# Patient Record
Sex: MALE | Race: WHITE | NOT HISPANIC OR LATINO | Employment: FULL TIME | URBAN - METROPOLITAN AREA
[De-identification: names, ages, dates, MRNs, and addresses within clinical notes are randomized per-mention and may not be internally consistent; named-entity substitution may affect disease eponyms.]

---

## 2023-10-17 PROBLEM — R35.1 NOCTURIA: Status: ACTIVE | Noted: 2023-10-17

## 2023-10-17 PROBLEM — R97.20 ELEVATED PSA: Status: ACTIVE | Noted: 2023-10-17

## 2023-10-17 PROBLEM — N52.9 ED (ERECTILE DYSFUNCTION): Status: ACTIVE | Noted: 2023-10-17

## 2023-10-17 PROBLEM — N13.8 BENIGN PROSTATIC HYPERPLASIA WITH URINARY OBSTRUCTION: Status: ACTIVE | Noted: 2023-10-17

## 2023-10-17 PROBLEM — N40.1 BENIGN PROSTATIC HYPERPLASIA WITH URINARY OBSTRUCTION: Status: ACTIVE | Noted: 2023-10-17

## 2023-10-17 RX ORDER — PANTOPRAZOLE SODIUM 40 MG/1
1 TABLET, DELAYED RELEASE ORAL DAILY
COMMUNITY

## 2023-10-17 RX ORDER — SIMVASTATIN 40 MG/1
40 TABLET, FILM COATED ORAL NIGHTLY
COMMUNITY

## 2023-10-17 RX ORDER — HYDROCHLOROTHIAZIDE 25 MG/1
25 TABLET ORAL DAILY
COMMUNITY

## 2023-10-17 RX ORDER — LISINOPRIL 40 MG/1
1 TABLET ORAL DAILY
COMMUNITY

## 2023-10-17 RX ORDER — METFORMIN HYDROCHLORIDE 1000 MG/1
1000 TABLET ORAL 2 TIMES DAILY
COMMUNITY

## 2023-10-17 RX ORDER — ASPIRIN 81 MG/1
81 TABLET ORAL
COMMUNITY

## 2023-10-18 ASSESSMENT — ENCOUNTER SYMPTOMS
COUGH: 0
PSYCHIATRIC NEGATIVE: 1
ENDOCRINE NEGATIVE: 1
CHILLS: 0
DIFFICULTY URINATING: 0
SHORTNESS OF BREATH: 0
NAUSEA: 0
EYES NEGATIVE: 1
ALLERGIC/IMMUNOLOGIC NEGATIVE: 1
FEVER: 0

## 2023-10-18 NOTE — PROGRESS NOTES
Subjective   Patient ID: Pavan Kramer is a 62 y.o. male.    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 He has failed oral medications. Most recent PSA was 6.70 on 8/23. Prior PSA was 2.87 on 4/2020. Prior PSA was 2.35 in 2019. Prior PSA was 2.14 in 2018. MRI on 9/23 showed PI-RAD 2.  No fhx of prostate cancer. Never had a bx. Chronic BPH sx are mild and stable. Some urgency and frequency. Denies dysuria. Denies hematuria. Nocturia x2-3.  No medication for LUT'S.       Review of Systems   Constitutional:  Negative for chills and fever.   HENT: Negative.     Eyes: Negative.    Respiratory:  Negative for cough and shortness of breath.    Cardiovascular:  Negative for chest pain and leg swelling.   Gastrointestinal:  Negative for nausea.   Endocrine: Negative.    Genitourinary:  Negative for difficulty urinating.        Negative except for documented in HPI   Allergic/Immunologic: Negative.    Neurological:         Alert & oriented X 3   Hematological:         Denies blood thinners   Psychiatric/Behavioral: Negative.         Objective   Physical Exam  Vitals and nursing note reviewed.   Constitutional:       General: He is not in acute distress.     Appearance: Normal appearance.   Pulmonary:      Effort: Pulmonary effort is normal.   Abdominal:      Tenderness: There is no abdominal tenderness.   Genitourinary:     Comments: Kidneys non palpable bilaterally  Bladder non palpable or tender  Scrotum no mass, No hydrocele  Epididymis- No spermatocele. Non Tender.  Testicles: No mass  Urethra: No discharge  Penis within normal limits... No lesions. Circumcised  Prostate - deferred  Neurological:      Mental Status: He is alert.         Assessment/Plan         Diagnoses and all orders for this visit:  Benign prostatic hyperplasia with urinary obstruction  Elevated PSA  Erectile dysfunction, unspecified erectile dysfunction type  Nocturia    All available PSA values reviewed, Options discussed. Questions answered.  MRI  reviewed  Will recheck PSA in 4 months   Diet changes for prostate health discussed and educational information given. Pros/Cons of prostate health supplements discussed.   Treatment options for LUTS reviewed  Discussed timed voiding. Discussed fluid and caffeine intake  Treatment options for ED reviewed.  TriMiX Rx given  Lifestyle change to help prevent UTIs discussed. Encouraged fluid intake.    F/U with PSA 4months    AFTER above visit I injected 0.2ml given of TriMix

## 2023-10-19 ENCOUNTER — OFFICE VISIT (OUTPATIENT)
Dept: UROLOGY | Facility: CLINIC | Age: 63
End: 2023-10-19
Payer: COMMERCIAL

## 2023-10-19 VITALS — RESPIRATION RATE: 16 BRPM | BODY MASS INDEX: 28.49 KG/M2 | WEIGHT: 188 LBS

## 2023-10-19 DIAGNOSIS — N13.8 BENIGN PROSTATIC HYPERPLASIA WITH URINARY OBSTRUCTION: ICD-10-CM

## 2023-10-19 DIAGNOSIS — R97.20 ELEVATED PSA: ICD-10-CM

## 2023-10-19 DIAGNOSIS — N40.1 BENIGN PROSTATIC HYPERPLASIA WITH URINARY OBSTRUCTION: ICD-10-CM

## 2023-10-19 DIAGNOSIS — R35.1 NOCTURIA: ICD-10-CM

## 2023-10-19 DIAGNOSIS — N52.9 ERECTILE DYSFUNCTION, UNSPECIFIED ERECTILE DYSFUNCTION TYPE: ICD-10-CM

## 2023-10-19 PROCEDURE — 99213 OFFICE O/P EST LOW 20 MIN: CPT | Performed by: UROLOGY

## 2023-10-19 PROCEDURE — 1036F TOBACCO NON-USER: CPT | Performed by: UROLOGY

## 2024-01-31 ENCOUNTER — LAB (OUTPATIENT)
Dept: LAB | Facility: LAB | Age: 64
End: 2024-01-31
Payer: COMMERCIAL

## 2024-01-31 DIAGNOSIS — R97.20 ELEVATED PSA: ICD-10-CM

## 2024-01-31 LAB — PSA SERPL-MCNC: 5.51 NG/ML

## 2024-01-31 PROCEDURE — 36415 COLL VENOUS BLD VENIPUNCTURE: CPT

## 2024-01-31 PROCEDURE — 84153 ASSAY OF PSA TOTAL: CPT

## 2024-02-20 ASSESSMENT — ENCOUNTER SYMPTOMS
ENDOCRINE NEGATIVE: 1
PSYCHIATRIC NEGATIVE: 1
ALLERGIC/IMMUNOLOGIC NEGATIVE: 1
EYES NEGATIVE: 1
FEVER: 0
CHILLS: 0
DIFFICULTY URINATING: 0
NAUSEA: 0
COUGH: 0
SHORTNESS OF BREATH: 0

## 2024-02-20 NOTE — PROGRESS NOTES
Subjective   Patient ID: Pavan Kramer is a 63 y.o. male.    HPI  Patient has hx of elevated PSA. Most recent PSA was 5.51 on 1/24. . Prior PSA was 6.70 on 8/23. Prior PSA was 2.87 on 4/2020. Prior PSA was 2.35 in 2019. Prior PSA was 2.14 in 2018. MRI on 9/23 showed PI-RAD 2.  No fhx of prostate cancer. Never had a bx. Chronic BPH sx are mild and stable. Some urgency and frequency. Denies dysuria. Denies hematuria. Nocturia x2-3.  No medication for LUT'S.  ED is chronic. Trimix PRN.       Review of Systems   Constitutional:  Negative for chills and fever.   HENT: Negative.     Eyes: Negative.    Respiratory:  Negative for cough and shortness of breath.    Cardiovascular:  Negative for chest pain and leg swelling.   Gastrointestinal:  Negative for nausea.   Endocrine: Negative.    Genitourinary:  Negative for difficulty urinating.        Negative except for documented in HPI   Allergic/Immunologic: Negative.    Neurological:         Alert & oriented X 3   Hematological:         Denies blood thinners   Psychiatric/Behavioral: Negative.         Objective   Physical Exam  Vitals and nursing note reviewed.   Constitutional:       General: He is not in acute distress.     Appearance: Normal appearance.   Pulmonary:      Effort: Pulmonary effort is normal.   Abdominal:      Tenderness: There is no abdominal tenderness.   Genitourinary:     Comments: Kidneys non palpable bilaterally  Bladder non palpable or tender  Scrotum no mass, No hydrocele  Epididymis- No spermatocele. Non Tender.  Testicles: No mass. WNL  Urethra: No discharge  Penis within normal limits... No lesions. circumcised  Prostate - symmetric, no nodules. BEnign  Seminal Vesicals: No mass.  Sphincter tone: normal  Neurological:      Mental Status: He is alert.         Assessment/Plan   Diagnoses and all orders for this visit:  Benign prostatic hyperplasia with urinary obstruction  Elevated PSA  Erectile dysfunction, unspecified erectile dysfunction  type  Nocturia    All available PSA values reviewed, Options discussed. Questions answered.  MRI reviewed  Pros and cons of prostate biopsy reviewed. Other options discussed. Questions answered  Theralogix given   Diet changes for prostate health discussed and educational information given. Pros/Cons of prostate health supplements discussed.   Treatment options for LUTS reviewed  Discussed timed voiding. Discussed fluid and caffeine intake  Treatment options for ED reviewed.  TriMix Rx refilled  Lifestyle change to help prevent UTIs discussed. Encouraged fluid intake.    F/U with PSA 6 months

## 2024-02-21 ENCOUNTER — OFFICE VISIT (OUTPATIENT)
Dept: UROLOGY | Facility: CLINIC | Age: 64
End: 2024-02-21
Payer: COMMERCIAL

## 2024-02-21 VITALS — RESPIRATION RATE: 16 BRPM | WEIGHT: 190 LBS | BODY MASS INDEX: 28.89 KG/M2

## 2024-02-21 DIAGNOSIS — N40.1 BENIGN PROSTATIC HYPERPLASIA WITH URINARY OBSTRUCTION: ICD-10-CM

## 2024-02-21 DIAGNOSIS — R35.1 NOCTURIA: ICD-10-CM

## 2024-02-21 DIAGNOSIS — N52.9 ERECTILE DYSFUNCTION, UNSPECIFIED ERECTILE DYSFUNCTION TYPE: ICD-10-CM

## 2024-02-21 DIAGNOSIS — R97.20 ELEVATED PSA: ICD-10-CM

## 2024-02-21 DIAGNOSIS — N13.8 BENIGN PROSTATIC HYPERPLASIA WITH URINARY OBSTRUCTION: ICD-10-CM

## 2024-02-21 PROCEDURE — 1036F TOBACCO NON-USER: CPT | Performed by: UROLOGY

## 2024-02-21 PROCEDURE — 99214 OFFICE O/P EST MOD 30 MIN: CPT | Performed by: UROLOGY

## 2024-02-21 RX ORDER — GABAPENTIN 300 MG/1
1 CAPSULE ORAL EVERY 8 HOURS
COMMUNITY
Start: 2018-09-27

## 2024-09-25 ENCOUNTER — LAB (OUTPATIENT)
Facility: LAB | Age: 64
End: 2024-09-25
Payer: COMMERCIAL

## 2024-09-25 DIAGNOSIS — R97.20 ELEVATED PSA: ICD-10-CM

## 2024-09-25 LAB — PSA SERPL-MCNC: 8.01 NG/ML

## 2024-09-25 PROCEDURE — 84153 ASSAY OF PSA TOTAL: CPT

## 2024-09-25 PROCEDURE — 36415 COLL VENOUS BLD VENIPUNCTURE: CPT

## 2024-10-03 ASSESSMENT — ENCOUNTER SYMPTOMS
COUGH: 0
ENDOCRINE NEGATIVE: 1
PSYCHIATRIC NEGATIVE: 1
CHILLS: 0
NAUSEA: 0
ALLERGIC/IMMUNOLOGIC NEGATIVE: 1
SHORTNESS OF BREATH: 0
FEVER: 0
EYES NEGATIVE: 1
DIFFICULTY URINATING: 0

## 2024-10-03 NOTE — PROGRESS NOTES
Subjective   Patient ID: Pavan Kramer is a 63 y.o. male.    HPI  Patient has hx of elevated PSA. Most recent PSA was 8.01 (9/24) Previous PSA was 5.51 on 1/24. . Prior PSA was 6.70 on 8/23. Prior PSA was 2.87 on 4/2020. Prior PSA was 2.35 in 2019. Prior PSA was 2.14 in 2018. MRI on 9/23 showed PI-RAD 2.  No fhx of prostate cancer. Never had a bx. Chronic BPH sx are mild and stable. Some urgency and frequency. Denies dysuria. Denies hematuria. Nocturia x2-3.  No medication for LUT'S.  ED is chronic. Trimix PRN.        Review of Systems   Constitutional:  Negative for chills and fever.   HENT: Negative.     Eyes: Negative.    Respiratory:  Negative for cough and shortness of breath.    Cardiovascular:  Negative for chest pain and leg swelling.   Gastrointestinal:  Negative for nausea.   Endocrine: Negative.    Genitourinary:  Negative for difficulty urinating.        Negative except for documented in HPI   Allergic/Immunologic: Negative.    Neurological:         Alert & oriented X 3   Hematological:         Denies blood thinners   Psychiatric/Behavioral: Negative.         Objective   Physical Exam  Vitals and nursing note reviewed.   Constitutional:       General: He is not in acute distress.     Appearance: Normal appearance.   Pulmonary:      Effort: Pulmonary effort is normal.   Abdominal:      Tenderness: There is no abdominal tenderness.   Genitourinary:     Comments: Kidneys non palpable bilaterally  Bladder non palpable or tender  Scrotum no mass, No hydrocele  Epididymis- No spermatocele. Non Tender.  Testicles: No mass. WNL  Urethra: No discharge  Penis within normal limits... No lesions. circumcised  Prostate - symmetric, no nodules. Perhaps firmer at [employer]PE[employer]  Seminal Vesicals: No mass.  Sphincter tone: normal  Neurological:      Mental Status: He is alert.         Assessment/Plan   Diagnoses and all orders for this visit:  Elevated PSA  Benign prostatic hyperplasia with urinary obstruction  Erectile  dysfunction, unspecified erectile dysfunction type  Nocturia      All available PSA values reviewed, Options discussed. Questions answered.  Past MRI reviewed  New MRI ordered  Pros and cons of prostate biopsy reviewed. Other options discussed. Questions answered   Diet changes for prostate health discussed and educational information given. Pros/Cons of prostate health supplements discussed.   Theralogix  Treatment options for LUTS reviewed  Discussed timed voiding. Discussed fluid and caffeine intake  Treatment options for ED reviewed.  Continue TriMix-refills SENT  Lifestyle change to help prevent UTIs discussed. Encouraged fluid intake.    F/U After MRI

## 2024-10-09 ENCOUNTER — APPOINTMENT (OUTPATIENT)
Dept: UROLOGY | Facility: CLINIC | Age: 64
End: 2024-10-09
Payer: COMMERCIAL

## 2024-10-09 VITALS — BODY MASS INDEX: 29.04 KG/M2 | SYSTOLIC BLOOD PRESSURE: 132 MMHG | DIASTOLIC BLOOD PRESSURE: 84 MMHG | WEIGHT: 191 LBS

## 2024-10-09 DIAGNOSIS — R97.20 ELEVATED PSA: ICD-10-CM

## 2024-10-09 DIAGNOSIS — N52.9 ERECTILE DYSFUNCTION, UNSPECIFIED ERECTILE DYSFUNCTION TYPE: ICD-10-CM

## 2024-10-09 DIAGNOSIS — N13.8 BENIGN PROSTATIC HYPERPLASIA WITH URINARY OBSTRUCTION: ICD-10-CM

## 2024-10-09 DIAGNOSIS — R35.1 NOCTURIA: ICD-10-CM

## 2024-10-09 DIAGNOSIS — N40.1 BENIGN PROSTATIC HYPERPLASIA WITH URINARY OBSTRUCTION: ICD-10-CM

## 2024-10-09 PROCEDURE — 1036F TOBACCO NON-USER: CPT | Performed by: UROLOGY

## 2024-10-09 PROCEDURE — 99214 OFFICE O/P EST MOD 30 MIN: CPT | Performed by: UROLOGY

## 2024-10-23 ENCOUNTER — HOSPITAL ENCOUNTER (OUTPATIENT)
Dept: RADIOLOGY | Facility: HOSPITAL | Age: 64
Discharge: HOME | End: 2024-10-23
Payer: COMMERCIAL

## 2024-10-23 DIAGNOSIS — R97.20 ELEVATED PSA: ICD-10-CM

## 2024-10-23 PROCEDURE — A9575 INJ GADOTERATE MEGLUMI 0.1ML: HCPCS | Performed by: UROLOGY

## 2024-10-23 PROCEDURE — 72197 MRI PELVIS W/O & W/DYE: CPT | Performed by: RADIOLOGY

## 2024-10-23 PROCEDURE — 72197 MRI PELVIS W/O & W/DYE: CPT

## 2024-10-23 PROCEDURE — 2550000001 HC RX 255 CONTRASTS: Performed by: UROLOGY

## 2024-10-23 RX ORDER — GADOTERATE MEGLUMINE 376.9 MG/ML
18 INJECTION INTRAVENOUS
Status: COMPLETED | OUTPATIENT
Start: 2024-10-23 | End: 2024-10-23

## 2024-10-31 ENCOUNTER — TELEMEDICINE (OUTPATIENT)
Dept: UROLOGY | Facility: CLINIC | Age: 64
End: 2024-10-31
Payer: COMMERCIAL

## 2024-10-31 DIAGNOSIS — R97.20 ELEVATED PSA: ICD-10-CM

## 2024-10-31 DIAGNOSIS — N13.8 BENIGN PROSTATIC HYPERPLASIA WITH URINARY OBSTRUCTION: ICD-10-CM

## 2024-10-31 DIAGNOSIS — R35.1 NOCTURIA: ICD-10-CM

## 2024-10-31 DIAGNOSIS — N52.9 ERECTILE DYSFUNCTION, UNSPECIFIED ERECTILE DYSFUNCTION TYPE: ICD-10-CM

## 2024-10-31 DIAGNOSIS — N40.1 BENIGN PROSTATIC HYPERPLASIA WITH URINARY OBSTRUCTION: ICD-10-CM

## 2024-10-31 PROCEDURE — 1036F TOBACCO NON-USER: CPT | Performed by: UROLOGY

## 2024-10-31 PROCEDURE — 99214 OFFICE O/P EST MOD 30 MIN: CPT | Performed by: UROLOGY

## 2024-10-31 RX ORDER — CIPROFLOXACIN 500 MG/1
500 TABLET ORAL 2 TIMES DAILY
Qty: 6 TABLET | Refills: 0 | Status: SHIPPED | OUTPATIENT
Start: 2024-10-31 | End: 2024-11-03

## 2024-10-31 ASSESSMENT — ENCOUNTER SYMPTOMS
ALLERGIC/IMMUNOLOGIC NEGATIVE: 1
FEVER: 0
ENDOCRINE NEGATIVE: 1
PSYCHIATRIC NEGATIVE: 1
CHILLS: 0
SHORTNESS OF BREATH: 0
NAUSEA: 0
COUGH: 0
EYES NEGATIVE: 1
DIFFICULTY URINATING: 0

## 2024-11-01 ENCOUNTER — PREP FOR PROCEDURE (OUTPATIENT)
Dept: UROLOGY | Facility: CLINIC | Age: 64
End: 2024-11-01
Payer: COMMERCIAL

## 2024-11-01 DIAGNOSIS — R97.20 ELEVATED PSA: ICD-10-CM

## 2024-11-18 ENCOUNTER — ANESTHESIA EVENT (OUTPATIENT)
Dept: OPERATING ROOM | Facility: HOSPITAL | Age: 64
End: 2024-11-18
Payer: COMMERCIAL

## 2024-11-19 ENCOUNTER — HOSPITAL ENCOUNTER (OUTPATIENT)
Facility: HOSPITAL | Age: 64
Setting detail: OUTPATIENT SURGERY
Discharge: HOME | End: 2024-11-19
Attending: UROLOGY | Admitting: UROLOGY
Payer: COMMERCIAL

## 2024-11-19 ENCOUNTER — ANESTHESIA (OUTPATIENT)
Dept: OPERATING ROOM | Facility: HOSPITAL | Age: 64
End: 2024-11-19
Payer: COMMERCIAL

## 2024-11-19 VITALS
SYSTOLIC BLOOD PRESSURE: 126 MMHG | TEMPERATURE: 97.5 F | BODY MASS INDEX: 28.47 KG/M2 | DIASTOLIC BLOOD PRESSURE: 86 MMHG | OXYGEN SATURATION: 98 % | RESPIRATION RATE: 18 BRPM | HEART RATE: 78 BPM | HEIGHT: 68 IN | WEIGHT: 187.83 LBS

## 2024-11-19 DIAGNOSIS — R97.20 ELEVATED PSA: Primary | ICD-10-CM

## 2024-11-19 PROBLEM — I10 HTN (HYPERTENSION): Status: ACTIVE | Noted: 2024-11-19

## 2024-11-19 PROBLEM — E11.9 DIABETES MELLITUS, TYPE 2 (MULTI): Status: ACTIVE | Noted: 2024-11-19

## 2024-11-19 LAB — GLUCOSE BLD MANUAL STRIP-MCNC: 154 MG/DL (ref 74–99)

## 2024-11-19 PROCEDURE — 82947 ASSAY GLUCOSE BLOOD QUANT: CPT

## 2024-11-19 PROCEDURE — 2500000004 HC RX 250 GENERAL PHARMACY W/ HCPCS (ALT 636 FOR OP/ED): Performed by: ANESTHESIOLOGY

## 2024-11-19 PROCEDURE — 3700000002 HC GENERAL ANESTHESIA TIME - EACH INCREMENTAL 1 MINUTE: Performed by: UROLOGY

## 2024-11-19 PROCEDURE — 88305 TISSUE EXAM BY PATHOLOGIST: CPT | Performed by: PATHOLOGY

## 2024-11-19 PROCEDURE — 2500000004 HC RX 250 GENERAL PHARMACY W/ HCPCS (ALT 636 FOR OP/ED): Performed by: PHARMACIST

## 2024-11-19 PROCEDURE — 7100000001 HC RECOVERY ROOM TIME - INITIAL BASE CHARGE: Performed by: UROLOGY

## 2024-11-19 PROCEDURE — 55700 PR PROSTATE NEEDLE BIOPSY ANY APPROACH: CPT | Performed by: UROLOGY

## 2024-11-19 PROCEDURE — 7100000010 HC PHASE TWO TIME - EACH INCREMENTAL 1 MINUTE: Performed by: UROLOGY

## 2024-11-19 PROCEDURE — 2500000001 HC RX 250 WO HCPCS SELF ADMINISTERED DRUGS (ALT 637 FOR MEDICARE OP): Performed by: ANESTHESIOLOGY

## 2024-11-19 PROCEDURE — 7100000002 HC RECOVERY ROOM TIME - EACH INCREMENTAL 1 MINUTE: Performed by: UROLOGY

## 2024-11-19 PROCEDURE — 3700000001 HC GENERAL ANESTHESIA TIME - INITIAL BASE CHARGE: Performed by: UROLOGY

## 2024-11-19 PROCEDURE — 76872 US TRANSRECTAL: CPT | Performed by: UROLOGY

## 2024-11-19 PROCEDURE — 7100000009 HC PHASE TWO TIME - INITIAL BASE CHARGE: Performed by: UROLOGY

## 2024-11-19 PROCEDURE — 3600000009 HC OR TIME - EACH INCREMENTAL 1 MINUTE - PROCEDURE LEVEL FOUR: Performed by: UROLOGY

## 2024-11-19 PROCEDURE — 76942 ECHO GUIDE FOR BIOPSY: CPT | Performed by: UROLOGY

## 2024-11-19 PROCEDURE — C1819 TISSUE LOCALIZATION-EXCISION: HCPCS | Performed by: UROLOGY

## 2024-11-19 PROCEDURE — 88344 IMHCHEM/IMCYTCHM EA MLT ANTB: CPT | Mod: TC,SAMLAB | Performed by: UROLOGY

## 2024-11-19 PROCEDURE — 3600000004 HC OR TIME - INITIAL BASE CHARGE - PROCEDURE LEVEL FOUR: Performed by: UROLOGY

## 2024-11-19 PROCEDURE — 2500000005 HC RX 250 GENERAL PHARMACY W/O HCPCS: Performed by: ANESTHESIOLOGY

## 2024-11-19 PROCEDURE — 2720000007 HC OR 272 NO HCPCS: Performed by: UROLOGY

## 2024-11-19 RX ORDER — LABETALOL HYDROCHLORIDE 5 MG/ML
5 INJECTION, SOLUTION INTRAVENOUS ONCE AS NEEDED
Status: DISCONTINUED | OUTPATIENT
Start: 2024-11-19 | End: 2024-11-19 | Stop reason: HOSPADM

## 2024-11-19 RX ORDER — MORPHINE SULFATE 4 MG/ML
4 INJECTION, SOLUTION INTRAMUSCULAR; INTRAVENOUS EVERY 5 MIN PRN
Status: DISCONTINUED | OUTPATIENT
Start: 2024-11-19 | End: 2024-11-19 | Stop reason: HOSPADM

## 2024-11-19 RX ORDER — OXYCODONE HYDROCHLORIDE 5 MG/1
5 TABLET ORAL EVERY 4 HOURS PRN
Status: DISCONTINUED | OUTPATIENT
Start: 2024-11-19 | End: 2024-11-19 | Stop reason: HOSPADM

## 2024-11-19 RX ORDER — HYDROCODONE BITARTRATE AND ACETAMINOPHEN 5; 325 MG/1; MG/1
1 TABLET ORAL EVERY 6 HOURS PRN
Qty: 20 TABLET | Refills: 0 | Status: SHIPPED | OUTPATIENT
Start: 2024-11-19

## 2024-11-19 RX ORDER — PROPOFOL 10 MG/ML
INJECTION, EMULSION INTRAVENOUS CONTINUOUS PRN
Status: DISCONTINUED | OUTPATIENT
Start: 2024-11-19 | End: 2024-11-19

## 2024-11-19 RX ORDER — CEFTRIAXONE 2 G/1
2 INJECTION, POWDER, FOR SOLUTION INTRAMUSCULAR; INTRAVENOUS ONCE
Status: DISCONTINUED | OUTPATIENT
Start: 2024-11-19 | End: 2024-11-19

## 2024-11-19 RX ORDER — SODIUM CHLORIDE, SODIUM LACTATE, POTASSIUM CHLORIDE, CALCIUM CHLORIDE 600; 310; 30; 20 MG/100ML; MG/100ML; MG/100ML; MG/100ML
100 INJECTION, SOLUTION INTRAVENOUS CONTINUOUS
Status: DISCONTINUED | OUTPATIENT
Start: 2024-11-19 | End: 2024-11-19 | Stop reason: HOSPADM

## 2024-11-19 RX ORDER — ACETAMINOPHEN 325 MG/1
975 TABLET ORAL ONCE
Status: COMPLETED | OUTPATIENT
Start: 2024-11-19 | End: 2024-11-19

## 2024-11-19 RX ORDER — CEFTRIAXONE 2 G/50ML
2 INJECTION, SOLUTION INTRAVENOUS ONCE
Status: COMPLETED | OUTPATIENT
Start: 2024-11-19 | End: 2024-11-19

## 2024-11-19 RX ORDER — ONDANSETRON HYDROCHLORIDE 2 MG/ML
4 INJECTION, SOLUTION INTRAVENOUS ONCE AS NEEDED
Status: COMPLETED | OUTPATIENT
Start: 2024-11-19 | End: 2024-11-19

## 2024-11-19 RX ORDER — MORPHINE SULFATE 2 MG/ML
2 INJECTION, SOLUTION INTRAMUSCULAR; INTRAVENOUS EVERY 5 MIN PRN
Status: DISCONTINUED | OUTPATIENT
Start: 2024-11-19 | End: 2024-11-19 | Stop reason: HOSPADM

## 2024-11-19 ASSESSMENT — PAIN SCALES - PAIN ASSESSMENT IN ADVANCED DEMENTIA (PAINAD): TOTALSCORE: MEDICATION (SEE MAR)

## 2024-11-19 ASSESSMENT — ENCOUNTER SYMPTOMS
COUGH: 0
ALLERGIC/IMMUNOLOGIC NEGATIVE: 1
FEVER: 0
NAUSEA: 0
DIFFICULTY URINATING: 0
CHILLS: 0
PSYCHIATRIC NEGATIVE: 1
ENDOCRINE NEGATIVE: 1
SHORTNESS OF BREATH: 0
EYES NEGATIVE: 1

## 2024-11-19 ASSESSMENT — PAIN SCALES - GENERAL
PAINLEVEL_OUTOF10: 3
PAINLEVEL_OUTOF10: 0 - NO PAIN
PAINLEVEL_OUTOF10: 5 - MODERATE PAIN
PAINLEVEL_OUTOF10: 0 - NO PAIN
PAINLEVEL_OUTOF10: 4
PAINLEVEL_OUTOF10: 4
PAINLEVEL_OUTOF10: 2
PAINLEVEL_OUTOF10: 4

## 2024-11-19 ASSESSMENT — PAIN - FUNCTIONAL ASSESSMENT
PAIN_FUNCTIONAL_ASSESSMENT: 0-10

## 2024-11-19 ASSESSMENT — COLUMBIA-SUICIDE SEVERITY RATING SCALE - C-SSRS
1. IN THE PAST MONTH, HAVE YOU WISHED YOU WERE DEAD OR WISHED YOU COULD GO TO SLEEP AND NOT WAKE UP?: NO
2. HAVE YOU ACTUALLY HAD ANY THOUGHTS OF KILLING YOURSELF?: NO
6. HAVE YOU EVER DONE ANYTHING, STARTED TO DO ANYTHING, OR PREPARED TO DO ANYTHING TO END YOUR LIFE?: NO

## 2024-11-19 ASSESSMENT — PAIN DESCRIPTION - LOCATION: LOCATION: PENIS

## 2024-11-19 NOTE — OP NOTE
Biopsy Prostate with Navigation, Ultrasonography Transrectal Prostate, Ultrasound guidance for prostate fusion biopsy Operative Note     Date: 2024  OR Location: [facility] OR    Name: Pavan Kramer, : 1960, Age: 63 y.o., MRN: [id number], Sex: male    Diagnosis  Pre-op Diagnosis      * Elevated PSA [R97.20] Post-op Diagnosis     * Elevated PSA [R97.20]     Procedures  Biopsy Prostate with Navigation  67428 - OR PROSTATE NEEDLE BIOPSY ANY APPROACH    Ultrasonography Transrectal Prostate  72015 - CHG US TRANSRECTAL    Ultrasound guidance for prostate fusion biopsy  89622 - CHG US GUIDANCE NEEDLE PLACEMENT IMG S&I      Surgeons      * Pavan Mcghee - Primary    Resident/Fellow/Other Assistant:  Surgeons and Role:  * No surgeons found with a matching role *    Staff:   Circulator: Ana Crawley Person: Estefani  Circulator: Kimberlyn    Anesthesia Staff: Anesthesiologist: Jean Paul Moreno MD    Procedure Summary  Anesthesia: Monitor Anesthesia Care  ASA: II  Estimated Blood Loss: 0mL  Intra-op Medications:   Administrations occurring from 0730 to 0745 on 24:   Medication Name Total Dose   ketamine injection 50 mg/ 5 mL (10 mg/mL) 30 mg   propofol (Diprivan) injection 10 mg/mL 76.68 mg              Anesthesia Record               Intraprocedure I/O Totals       None           Specimen:   ID Type Source Tests Collected by Time   1 : PROSTATE NEEDLE BIOPSY RIGHT Tissue PROSTATE NEEDLE BIOPSY RIGHT SURGICAL PATHOLOGY EXAM Pavan Mcghee MD 2024 0745   2 : PROSTATE NEEDLE BIOPSY LEFT Tissue PROSTATE NEEDLE BIOPSY LEFT SURGICAL PATHOLOGY EXAM Pavan Mcghee MD 2024 0745   3 : AREA OF INTEREST#1 Tissue PROSTATE BIOPSY TARGETED CHEMO SURGICAL PATHOLOGY EXAM Pavan Mcghee MD 2024 0745                 Drains and/or Catheters: * None in log *    Tourniquet Times:           Indications: Pavan Kramer is an 63 y.o. male who is having surgery for Elevated PSA [R97.20].     The patient was seen in the preoperative area.  The risks, benefits, complications, treatment options, non-operative alternatives, expected recovery and outcomes were discussed with the patient. The possibilities of reaction to medication, pulmonary aspiration, injury to surrounding structures, bleeding, recurrent infection, the need for additional procedures, failure to diagnose a condition, and creating a complication requiring transfusion or operation were discussed with the patient. The patient concurred with the proposed plan, giving informed consent.  The site of surgery was properly noted/marked if necessary per policy. The patient has been actively warmed in preoperative area.   Pre Op dx: Elevated PSA and Abnormal MRI of the prostate    Post Op Dx: SAME    Procedure: MRI Guided Fusion Bx of the prostate    Physician: KEY    Anesthesia: MAC    Estimated Blood Loss: Minimal    Complications: NONE    Indications and Consent: Patient present for prostate Biopsy of lesion found on MRI. After the risks,, benefits, and indications were explained he consented to the procedure.    PROCEDURE: After adequate sedation was obtained the ultrasound probe was inserted into the rectum. An ultrasound sweep of the prostate was performed. These images were then fused with the previously obtained MRI images. The lesion(s) were identified. Multiple targeted biopsies were obtained . I also performed standard Sextant biopsies of the right and left lobe of the prostate. The patient tolerated the procedure well.     Attending Attestation: I was present for the entire procedure.    Pavan Mcghee  Phone Number: [phone]

## 2024-11-19 NOTE — H&P
"History Of Present Illness  Pavan Kramer is a 63 y.o. male presenting with elevated PSA.     Past Medical History  Past Medical History:   Diagnosis Date    GERD (gastroesophageal reflux disease)     Hyperlipidemia     Hypertension     Type 2 diabetes mellitus        Surgical History  Past Surgical History:   Procedure Laterality Date    HERNIA REPAIR Right     recurrent hernia repair with mesh    SKIN GRAFT          Social History  He reports that he has never smoked. He has never used smokeless tobacco. He reports that he does not currently use alcohol. He reports current drug use. Frequency: 5.00 times per week. Drug: Marijuana.    Family History  Family History   Problem Relation Name Age of Onset    No Known Problems Mother      No Known Problems Father          Allergies  Patient has no known allergies.    Review of Systems   Constitutional:  Negative for chills and fever.   HENT: Negative.     Eyes: Negative.    Respiratory:  Negative for cough and shortness of breath.    Cardiovascular:  Negative for chest pain and leg swelling.   Gastrointestinal:  Negative for nausea.   Endocrine: Negative.    Genitourinary:  Negative for difficulty urinating.        Negative except for documented in HPI   Allergic/Immunologic: Negative.    Neurological:         Alert & oriented X 3   Hematological:         Denies blood thinners   Psychiatric/Behavioral: Negative.          Physical Exam  Vitals and nursing note reviewed.   Pulmonary:      Effort: Pulmonary effort is normal.   Abdominal:      Palpations: Abdomen is soft.      Tenderness: There is no abdominal tenderness.   Genitourinary:     Comments: Kidneys non palpable bilaterally  Bladder non palpable or tender  Neurological:      Mental Status: He is alert.          Last Recorded Vitals  Blood pressure 131/85, pulse 80, temperature 36.1 °C (97 °F), temperature source Temporal, resp. rate 12, height 1.727 m (5' 8\"), weight 85.2 kg (187 lb 13.3 oz), SpO2 " 96%.      Assessment/Plan   Assessment & Plan  Elevated PSA    HTN (hypertension)    Diabetes mellitus, type 2 (Multi)            Pavan Mcghee MD

## 2024-11-19 NOTE — ANESTHESIA POSTPROCEDURE EVALUATION
Patient: Pavan Kramer    Procedure Summary       Date: 11/19/24 Room / Location: [facility] OR 01 / [facility]    Anesthesia Start: 0736 Anesthesia Stop: 0803    Procedures:       Biopsy Prostate with Navigation      Ultrasonography Transrectal Prostate      Ultrasound guidance for prostate fusion biopsy Diagnosis:       Elevated PSA      (Elevated PSA [R97.20])    Surgeons: Pavan Mcghee MD Responsible Provider: Jean Paul Moreno MD    Anesthesia Type: MAC ASA Status: 2            Anesthesia Type: MAC    Vitals Value Taken Time   BP 93/73 11/19/24 0801   Temp 36.1 °C (97 °F) 11/19/24 0801   Pulse 77 11/19/24 0801   Resp 14 11/19/24 0801   SpO2 98 % 11/19/24 0801       Anesthesia Post Evaluation    Patient location during evaluation: bedside  Patient participation: complete - patient participated  Level of consciousness: sleepy but conscious  Pain management: adequate  Airway patency: patent  Cardiovascular status: acceptable  Respiratory status: acceptable  Hydration status: acceptable  Postoperative Nausea and Vomiting: none    No notable events documented.

## 2024-11-19 NOTE — ANESTHESIA PREPROCEDURE EVALUATION
Patient: Pavan Kramer    Procedure Information       Date/Time: 11/19/24 0730    Procedures:       Biopsy Prostate with Navigation      Ultrasonography Transrectal Prostate      Ultrasound guidance for prostate fusion biopsy    Location: [facility] OR 01 / [facility]    Surgeons: Pavan Mcghee MD            Relevant Problems   Cardiac   (+) HTN (hypertension)      /Renal   (+) Benign prostatic hyperplasia with urinary obstruction      Endocrine   (+) Diabetes mellitus, type 2 (Multi)       Clinical information reviewed:   Tobacco  Allergies  Meds   Med Hx  Surg Hx   Fam Hx  Soc Hx        NPO Detail:  NPO/Void Status  Carbohydrate Drink Given Prior to Surgery? : N  Date of Last Liquid: 11/19/24  Time of Last Liquid: 0500  Date of Last Solid: 11/17/24  Time of Last Solid: 1200  Last Intake Type: Clear fluids  Time of Last Void: 0615         Physical Exam    Airway  Mallampati: II  TM distance: >3 FB  Neck ROM: full     Cardiovascular   Rhythm: regular  Rate: normal     Dental    Pulmonary    Abdominal            Anesthesia Plan    History of general anesthesia?: yes  History of complications of general anesthesia?: no    ASA 2     MAC     Anesthetic plan and risks discussed with patient.

## 2024-12-04 LAB
LAB AP ASR DISCLAIMER: NORMAL
LABORATORY COMMENT REPORT: NORMAL
PATH REPORT.FINAL DX SPEC: NORMAL
PATH REPORT.GROSS SPEC: NORMAL
PATH REPORT.RELEVANT HX SPEC: NORMAL
PATH REPORT.TOTAL CANCER: NORMAL

## 2024-12-04 PROCEDURE — 88344 IMHCHEM/IMCYTCHM EA MLT ANTB: CPT | Performed by: PATHOLOGY

## 2024-12-10 ASSESSMENT — ENCOUNTER SYMPTOMS
FEVER: 0
ENDOCRINE NEGATIVE: 1
PSYCHIATRIC NEGATIVE: 1
COUGH: 0
SHORTNESS OF BREATH: 0
DIFFICULTY URINATING: 0
CHILLS: 0
NAUSEA: 0
ALLERGIC/IMMUNOLOGIC NEGATIVE: 1
EYES NEGATIVE: 1

## 2024-12-10 NOTE — PROGRESS NOTES
Subjective   Patient ID: Pavan Kramer is a 63 y.o. male.    HPI  Patient is here for prostate MRI fusion bx results. Path showed prostate cancer. Isreal 7. Most recent PSA was 8.01 (9/24) Previous PSA was 5.51 on 1/24. . Prior PSA was 6.70 on 8/23. Prior PSA was 2.87 on 4/2020. Prior PSA was 2.35 in 2019. Prior PSA was 2.14 in 2018. MRI on 9/23 showed PI-RAD 2.  No fhx of prostate cancer. Never had a bx. Chronic BPH sx are mild and stable. Some urgency and frequency. Denies dysuria. Some hematuria since the biopsy. Nocturia x2-3.  No medication for LUT'S.  ED is chronic. Trimix PRN.          Review of Systems   Constitutional:  Negative for chills and fever.   HENT: Negative.     Eyes: Negative.    Respiratory:  Negative for cough and shortness of breath.    Cardiovascular:  Negative for chest pain and leg swelling.   Gastrointestinal:  Negative for nausea.   Endocrine: Negative.    Genitourinary:  Negative for difficulty urinating.        Negative except for documented in HPI   Allergic/Immunologic: Negative.    Neurological:         Alert & oriented X 3   Hematological:         Denies blood thinners   Psychiatric/Behavioral: Negative.         Objective   Physical Exam    Assessment/Plan   Diagnoses and all orders for this visit:  Nocturia  Elevated PSA  Erectile dysfunction, unspecified erectile dysfunction type  Malignant neoplasm of prostate (Multi)      All available PSA values reviewed, Options discussed. Questions answered.  Path report reviewed. Tx Options discussed. Pros/cons of tx options reviewed. Questions answered  Referral to Dr Mederos to discuss XRT  Pros/cons of Active Surveillance discussed   Diet changes for prostate health discussed and educational information given. Pros/Cons of prostate health supplements discussed.   Treatment options for LUTS reviewed  Discussed timed voiding. Discussed fluid and caffeine intake  Treatment options for ED reviewed.  Continue TriMix-refills faxed      F/U 4  months.

## 2024-12-12 ENCOUNTER — APPOINTMENT (OUTPATIENT)
Dept: UROLOGY | Facility: CLINIC | Age: 64
End: 2024-12-12
Payer: COMMERCIAL

## 2024-12-12 VITALS
BODY MASS INDEX: 29.65 KG/M2 | DIASTOLIC BLOOD PRESSURE: 58 MMHG | RESPIRATION RATE: 16 BRPM | HEART RATE: 93 BPM | WEIGHT: 195 LBS | SYSTOLIC BLOOD PRESSURE: 85 MMHG

## 2024-12-12 DIAGNOSIS — R35.1 NOCTURIA: ICD-10-CM

## 2024-12-12 DIAGNOSIS — C61 MALIGNANT NEOPLASM OF PROSTATE (MULTI): ICD-10-CM

## 2024-12-12 DIAGNOSIS — R97.20 ELEVATED PSA: ICD-10-CM

## 2024-12-12 DIAGNOSIS — N52.9 ERECTILE DYSFUNCTION, UNSPECIFIED ERECTILE DYSFUNCTION TYPE: ICD-10-CM

## 2024-12-12 PROCEDURE — 3078F DIAST BP <80 MM HG: CPT | Performed by: UROLOGY

## 2024-12-12 PROCEDURE — 3074F SYST BP LT 130 MM HG: CPT | Performed by: UROLOGY

## 2024-12-12 PROCEDURE — 99214 OFFICE O/P EST MOD 30 MIN: CPT | Performed by: UROLOGY

## 2024-12-12 PROCEDURE — 4010F ACE/ARB THERAPY RXD/TAKEN: CPT | Performed by: UROLOGY

## 2024-12-12 PROCEDURE — 1036F TOBACCO NON-USER: CPT | Performed by: UROLOGY

## 2024-12-31 ENCOUNTER — PREP FOR PROCEDURE (OUTPATIENT)
Dept: UROLOGY | Facility: CLINIC | Age: 64
End: 2024-12-31
Payer: COMMERCIAL

## 2024-12-31 DIAGNOSIS — C61 PROSTATE CANCER (MULTI): ICD-10-CM

## 2025-01-14 ENCOUNTER — ANESTHESIA EVENT (OUTPATIENT)
Dept: OPERATING ROOM | Facility: HOSPITAL | Age: 65
End: 2025-01-14
Payer: COMMERCIAL

## 2025-01-14 ENCOUNTER — HOSPITAL ENCOUNTER (OUTPATIENT)
Facility: HOSPITAL | Age: 65
Setting detail: OUTPATIENT SURGERY
Discharge: HOME | End: 2025-01-14
Attending: UROLOGY | Admitting: UROLOGY
Payer: COMMERCIAL

## 2025-01-14 ENCOUNTER — ANESTHESIA (OUTPATIENT)
Dept: OPERATING ROOM | Facility: HOSPITAL | Age: 65
End: 2025-01-14
Payer: COMMERCIAL

## 2025-01-14 VITALS
RESPIRATION RATE: 16 BRPM | TEMPERATURE: 96.9 F | WEIGHT: 195 LBS | DIASTOLIC BLOOD PRESSURE: 93 MMHG | SYSTOLIC BLOOD PRESSURE: 126 MMHG | BODY MASS INDEX: 29.65 KG/M2 | OXYGEN SATURATION: 100 % | HEART RATE: 84 BPM

## 2025-01-14 DIAGNOSIS — C61 PROSTATE CANCER (MULTI): Primary | ICD-10-CM

## 2025-01-14 LAB — GLUCOSE BLD MANUAL STRIP-MCNC: 217 MG/DL (ref 74–99)

## 2025-01-14 PROCEDURE — 7100000001 HC RECOVERY ROOM TIME - INITIAL BASE CHARGE: Performed by: UROLOGY

## 2025-01-14 PROCEDURE — 2500000005 HC RX 250 GENERAL PHARMACY W/O HCPCS: Performed by: UROLOGY

## 2025-01-14 PROCEDURE — 2500000004 HC RX 250 GENERAL PHARMACY W/ HCPCS (ALT 636 FOR OP/ED): Performed by: UROLOGY

## 2025-01-14 PROCEDURE — 2500000004 HC RX 250 GENERAL PHARMACY W/ HCPCS (ALT 636 FOR OP/ED): Performed by: ANESTHESIOLOGY

## 2025-01-14 PROCEDURE — 7100000009 HC PHASE TWO TIME - INITIAL BASE CHARGE: Performed by: UROLOGY

## 2025-01-14 PROCEDURE — 3600000004 HC OR TIME - INITIAL BASE CHARGE - PROCEDURE LEVEL FOUR: Performed by: UROLOGY

## 2025-01-14 PROCEDURE — 3700000002 HC GENERAL ANESTHESIA TIME - EACH INCREMENTAL 1 MINUTE: Performed by: UROLOGY

## 2025-01-14 PROCEDURE — 3600000009 HC OR TIME - EACH INCREMENTAL 1 MINUTE - PROCEDURE LEVEL FOUR: Performed by: UROLOGY

## 2025-01-14 PROCEDURE — 55876 PLACE RT DEVICE/MARKER PROS: CPT | Performed by: UROLOGY

## 2025-01-14 PROCEDURE — 82947 ASSAY GLUCOSE BLOOD QUANT: CPT

## 2025-01-14 PROCEDURE — 2500000001 HC RX 250 WO HCPCS SELF ADMINISTERED DRUGS (ALT 637 FOR MEDICARE OP): Performed by: ANESTHESIOLOGY

## 2025-01-14 PROCEDURE — 2780000003 HC OR 278 NO HCPCS: Performed by: UROLOGY

## 2025-01-14 PROCEDURE — 7100000002 HC RECOVERY ROOM TIME - EACH INCREMENTAL 1 MINUTE: Performed by: UROLOGY

## 2025-01-14 PROCEDURE — 55874 TPRNL PLMT BIODEGRDABL MATRL: CPT | Performed by: UROLOGY

## 2025-01-14 PROCEDURE — 3700000001 HC GENERAL ANESTHESIA TIME - INITIAL BASE CHARGE: Performed by: UROLOGY

## 2025-01-14 PROCEDURE — 7100000010 HC PHASE TWO TIME - EACH INCREMENTAL 1 MINUTE: Performed by: UROLOGY

## 2025-01-14 PROCEDURE — C1889 IMPLANT/INSERT DEVICE, NOC: HCPCS | Performed by: UROLOGY

## 2025-01-14 DEVICE — STERILE PLACEMENT NEEDLES (17GA ETW X 20CM) WITH BONE WAX AND (1.2 X 3MM) SOFT TISSUE GOLD MARKER [3]
Type: IMPLANTABLE DEVICE | Site: PROSTATE | Status: FUNCTIONAL
Brand: FIDUCIAL MARKER KIT

## 2025-01-14 RX ORDER — GLIMEPIRIDE 2 MG/1
2 TABLET ORAL
COMMUNITY

## 2025-01-14 RX ORDER — METOPROLOL TARTRATE 50 MG/1
50 TABLET ORAL DAILY
COMMUNITY

## 2025-01-14 RX ORDER — SUCCINYLCHOLINE CHLORIDE 20 MG/ML
INJECTION INTRAMUSCULAR; INTRAVENOUS AS NEEDED
Status: DISCONTINUED | OUTPATIENT
Start: 2025-01-14 | End: 2025-01-14

## 2025-01-14 RX ORDER — ACETAMINOPHEN 325 MG/1
975 TABLET ORAL ONCE
Status: COMPLETED | OUTPATIENT
Start: 2025-01-14 | End: 2025-01-14

## 2025-01-14 RX ORDER — MORPHINE SULFATE 4 MG/ML
2 INJECTION, SOLUTION INTRAMUSCULAR; INTRAVENOUS EVERY 5 MIN PRN
Status: DISCONTINUED | OUTPATIENT
Start: 2025-01-14 | End: 2025-01-14 | Stop reason: HOSPADM

## 2025-01-14 RX ORDER — FENTANYL CITRATE 50 UG/ML
INJECTION, SOLUTION INTRAMUSCULAR; INTRAVENOUS AS NEEDED
Status: DISCONTINUED | OUTPATIENT
Start: 2025-01-14 | End: 2025-01-14

## 2025-01-14 RX ORDER — DEXAMETHASONE SODIUM PHOSPHATE 10 MG/ML
8 INJECTION INTRAMUSCULAR; INTRAVENOUS ONCE
Status: COMPLETED | OUTPATIENT
Start: 2025-01-14 | End: 2025-01-14

## 2025-01-14 RX ORDER — LABETALOL HYDROCHLORIDE 5 MG/ML
5 INJECTION, SOLUTION INTRAVENOUS ONCE AS NEEDED
Status: DISCONTINUED | OUTPATIENT
Start: 2025-01-14 | End: 2025-01-14 | Stop reason: HOSPADM

## 2025-01-14 RX ORDER — OXYCODONE HYDROCHLORIDE 5 MG/1
5 TABLET ORAL EVERY 4 HOURS PRN
Status: DISCONTINUED | OUTPATIENT
Start: 2025-01-14 | End: 2025-01-14 | Stop reason: HOSPADM

## 2025-01-14 RX ORDER — PROPOFOL 10 MG/ML
INJECTION, EMULSION INTRAVENOUS AS NEEDED
Status: DISCONTINUED | OUTPATIENT
Start: 2025-01-14 | End: 2025-01-14

## 2025-01-14 RX ORDER — SODIUM CHLORIDE 9 MG/ML
INJECTION INTRAMUSCULAR; INTRAVENOUS; SUBCUTANEOUS AS NEEDED
Status: DISCONTINUED | OUTPATIENT
Start: 2025-01-14 | End: 2025-01-14 | Stop reason: HOSPADM

## 2025-01-14 RX ORDER — ONDANSETRON HYDROCHLORIDE 2 MG/ML
4 INJECTION, SOLUTION INTRAVENOUS ONCE AS NEEDED
Status: DISCONTINUED | OUTPATIENT
Start: 2025-01-14 | End: 2025-01-14 | Stop reason: HOSPADM

## 2025-01-14 RX ORDER — ROCURONIUM BROMIDE 10 MG/ML
INJECTION, SOLUTION INTRAVENOUS AS NEEDED
Status: DISCONTINUED | OUTPATIENT
Start: 2025-01-14 | End: 2025-01-14

## 2025-01-14 RX ORDER — MORPHINE SULFATE 4 MG/ML
4 INJECTION, SOLUTION INTRAMUSCULAR; INTRAVENOUS EVERY 5 MIN PRN
Status: DISCONTINUED | OUTPATIENT
Start: 2025-01-14 | End: 2025-01-14 | Stop reason: HOSPADM

## 2025-01-14 RX ORDER — CIPROFLOXACIN 500 MG/1
250 TABLET ORAL 2 TIMES DAILY
Qty: 3 TABLET | Refills: 0 | Status: SHIPPED | OUTPATIENT
Start: 2025-01-14 | End: 2025-01-17

## 2025-01-14 RX ORDER — MIDAZOLAM HYDROCHLORIDE 1 MG/ML
2 INJECTION INTRAMUSCULAR; INTRAVENOUS ONCE
Status: COMPLETED | OUTPATIENT
Start: 2025-01-14 | End: 2025-01-14

## 2025-01-14 RX ORDER — ONDANSETRON HYDROCHLORIDE 2 MG/ML
4 INJECTION, SOLUTION INTRAVENOUS ONCE
Status: COMPLETED | OUTPATIENT
Start: 2025-01-14 | End: 2025-01-14

## 2025-01-14 RX ORDER — LEVOFLOXACIN 5 MG/ML
500 INJECTION, SOLUTION INTRAVENOUS ONCE
Status: COMPLETED | OUTPATIENT
Start: 2025-01-14 | End: 2025-01-14

## 2025-01-14 RX ORDER — PHENAZOPYRIDINE HYDROCHLORIDE 200 MG/1
200 TABLET, FILM COATED ORAL 3 TIMES DAILY PRN
Qty: 30 TABLET | Refills: 0 | Status: SHIPPED | OUTPATIENT
Start: 2025-01-14

## 2025-01-14 RX ORDER — SODIUM CHLORIDE, SODIUM LACTATE, POTASSIUM CHLORIDE, CALCIUM CHLORIDE 600; 310; 30; 20 MG/100ML; MG/100ML; MG/100ML; MG/100ML
100 INJECTION, SOLUTION INTRAVENOUS CONTINUOUS
Status: DISCONTINUED | OUTPATIENT
Start: 2025-01-14 | End: 2025-01-14 | Stop reason: HOSPADM

## 2025-01-14 RX ORDER — HYDROCODONE BITARTRATE AND ACETAMINOPHEN 5; 325 MG/1; MG/1
1 TABLET ORAL EVERY 6 HOURS PRN
Qty: 20 TABLET | Refills: 0 | Status: SHIPPED | OUTPATIENT
Start: 2025-01-14

## 2025-01-14 RX ADMIN — MORPHINE SULFATE 4 MG: 4 INJECTION, SOLUTION INTRAMUSCULAR; INTRAVENOUS at 13:50

## 2025-01-14 RX ADMIN — MORPHINE SULFATE 4 MG: 4 INJECTION, SOLUTION INTRAMUSCULAR; INTRAVENOUS at 13:59

## 2025-01-14 RX ADMIN — ONDANSETRON 4 MG: 2 INJECTION INTRAMUSCULAR; INTRAVENOUS at 12:09

## 2025-01-14 RX ADMIN — DEXAMETHASONE SODIUM PHOSPHATE 8 MG: 10 INJECTION, SOLUTION INTRAMUSCULAR; INTRAVENOUS at 12:09

## 2025-01-14 RX ADMIN — ACETAMINOPHEN 975 MG: 325 TABLET ORAL at 12:05

## 2025-01-14 RX ADMIN — FENTANYL CITRATE 100 MCG: 50 INJECTION, SOLUTION INTRAMUSCULAR; INTRAVENOUS at 13:11

## 2025-01-14 RX ADMIN — SUCCINYLCHOLINE CHLORIDE 60 MG: 20 INJECTION, SOLUTION INTRAMUSCULAR; INTRAVENOUS at 13:20

## 2025-01-14 RX ADMIN — FENTANYL CITRATE 100 MCG: 50 INJECTION, SOLUTION INTRAMUSCULAR; INTRAVENOUS at 13:20

## 2025-01-14 RX ADMIN — ROCURONIUM BROMIDE 10 MG: 10 INJECTION INTRAVENOUS at 13:11

## 2025-01-14 RX ADMIN — PROPOFOL 200 MG: 10 INJECTION, EMULSION INTRAVENOUS at 13:11

## 2025-01-14 RX ADMIN — LEVOFLOXACIN 500 MG: 5 INJECTION, SOLUTION INTRAVENOUS at 12:27

## 2025-01-14 RX ADMIN — SUCCINYLCHOLINE CHLORIDE 100 MG: 20 INJECTION, SOLUTION INTRAMUSCULAR; INTRAVENOUS at 13:11

## 2025-01-14 RX ADMIN — MIDAZOLAM HYDROCHLORIDE 2 MG: 1 INJECTION, SOLUTION INTRAMUSCULAR; INTRAVENOUS at 12:19

## 2025-01-14 ASSESSMENT — COLUMBIA-SUICIDE SEVERITY RATING SCALE - C-SSRS
2. HAVE YOU ACTUALLY HAD ANY THOUGHTS OF KILLING YOURSELF?: NO
6. HAVE YOU EVER DONE ANYTHING, STARTED TO DO ANYTHING, OR PREPARED TO DO ANYTHING TO END YOUR LIFE?: NO
1. IN THE PAST MONTH, HAVE YOU WISHED YOU WERE DEAD OR WISHED YOU COULD GO TO SLEEP AND NOT WAKE UP?: NO

## 2025-01-14 ASSESSMENT — ENCOUNTER SYMPTOMS
DIFFICULTY URINATING: 0
ALLERGIC/IMMUNOLOGIC NEGATIVE: 1
ENDOCRINE NEGATIVE: 1
NAUSEA: 0
COUGH: 0
EYES NEGATIVE: 1
CHILLS: 0
FEVER: 0
SHORTNESS OF BREATH: 0
PSYCHIATRIC NEGATIVE: 1

## 2025-01-14 ASSESSMENT — PAIN SCALES - GENERAL
PAINLEVEL_OUTOF10: 0 - NO PAIN
PAINLEVEL_OUTOF10: 7
PAINLEVEL_OUTOF10: 0 - NO PAIN
PAINLEVEL_OUTOF10: 7
PAINLEVEL_OUTOF10: 0 - NO PAIN
PAINLEVEL_OUTOF10: 0 - NO PAIN
PAINLEVEL_OUTOF10: 7
PAINLEVEL_OUTOF10: 7
PAINLEVEL_OUTOF10: 5 - MODERATE PAIN

## 2025-01-14 ASSESSMENT — PAIN - FUNCTIONAL ASSESSMENT
PAIN_FUNCTIONAL_ASSESSMENT: 0-10

## 2025-01-14 NOTE — ANESTHESIA PREPROCEDURE EVALUATION
Patient: Pavan Kramer    Procedure Information       Date/Time: 11/19/24 8930    Procedures:       Biopsy Prostate with Navigation      Ultrasonography Transrectal Prostate      Ultrasound guidance for prostate fusion biopsy    Location: [facility] OR 01 / [facility]    Surgeons: Pavan Mcghee MD            Relevant Problems   Anesthesia (within normal limits)      Cardiac   (+) HTN (hypertension)      /Renal   (+) Benign prostatic hyperplasia with urinary obstruction   (+) Malignant neoplasm of prostate (Multi)   (+) Prostate cancer (Multi)      Endocrine   (+) Diabetes mellitus, type 2 (Multi)       Clinical information reviewed:    Allergies  Meds               NPO Detail:  No data recorded       Physical Exam    Airway  Mallampati: II  TM distance: >3 FB  Neck ROM: full     Cardiovascular   Rhythm: regular  Rate: normal     Dental    Pulmonary    Abdominal            Anesthesia Plan    History of general anesthesia?: yes  History of complications of general anesthesia?: no    ASA 2     MAC     Anesthetic plan and risks discussed with patient.

## 2025-01-14 NOTE — ANESTHESIA POSTPROCEDURE EVALUATION
Patient: Pavan Kramer    Procedure Summary       Date: 01/14/25 Room / Location: [facility] OR 01 / [facility]    Anesthesia Start: 1311 Anesthesia Stop: 1338    Procedure: Insertion Fiducial Marker Prostate Diagnosis:       Prostate cancer (Multi)      (Prostate cancer (Multi) [C61])    Surgeons: Pavan Mcghee MD Responsible Provider: Jean Paul Moreno MD    Anesthesia Type: MAC ASA Status: 2            Anesthesia Type: MAC    Vitals Value Taken Time   /80 01/14/25 1400   Temp 36.8 °C (98.2 °F) 01/14/25 1340   Pulse 91 01/14/25 1400   Resp 14 01/14/25 1400   SpO2 97 % 01/14/25 1400       Anesthesia Post Evaluation    Patient location during evaluation: bedside  Patient participation: complete - patient participated  Level of consciousness: sleepy but conscious  Pain management: adequate  Airway patency: patent  Cardiovascular status: acceptable  Respiratory status: acceptable  Hydration status: acceptable  Postoperative Nausea and Vomiting: none    No notable events documented.

## 2025-01-14 NOTE — OP NOTE
Insertion Fiducial Marker Prostate Operative Note     Date: 2025  OR Location: [facility]    Name: Pavan Kramer, : 1960, Age: 64 y.o., MRN: [id number], Sex: male    Diagnosis  Pre-op Diagnosis      * Prostate cancer (Multi) [C61] Post-op Diagnosis     * Prostate cancer (Multi) [C61]     Procedures  Insertion Fiducial Marker Prostate  60126 - ME PLMT INTERSTITIAL DEV RADIAT TX PROSTATE 1/MULT      Surgeons      * Pavan Mcghee - Primary    Resident/Fellow/Other Assistant:  Surgeons and Role:  * No surgeons found with a matching role *    Staff:   Circulator: Ana Crawley Person: Estefani    Anesthesia Staff: Anesthesiologist: Jean Paul Moreno MD    Procedure Summary  Anesthesia: Anesthesia type not filed in the log.  ASA: II  Estimated Blood Loss: 0mL  Intra-op Medications: Administrations occurring from 1230 to 1300 on 25:  * No intraprocedure medications in log *           Anesthesia Record               Intraprocedure I/O Totals       None           Specimen: No specimens collected              Drains and/or Catheters: * None in log *    Tourniquet Times:         Implants:  Implants       Type Name Action Serial No.      Implant GOLD MARKERS, 1.2MM, SOFT TISSUE, 20CM 17GA NEEDLES, 3-PK, STRL - SN/A - [id number] Implanted N/A                Indications: Pavan Kramer is an 64 y.o. male who is having surgery for Prostate cancer (Multi) [C61].     The patient was seen in the preoperative area. The risks, benefits, complications, treatment options, non-operative alternatives, expected recovery and outcomes were discussed with the patient. The possibilities of reaction to medication, pulmonary aspiration, injury to surrounding structures, bleeding, recurrent infection, the need for additional procedures, failure to diagnose a condition, and creating a complication requiring transfusion or operation were discussed with the patient. The patient concurred with the proposed plan, giving informed consent.  The  site of surgery was properly noted/marked if necessary per policy. The patient has been actively warmed in preoperative area.   Anesthetic:  General    Estimated blood loss:  Minimal.    Complications:  None.    Pre-Op Diagnosis:  Prostate cancer.    Post-Op Diagnosis:  Prostate cancer.    Operation:  ULTRASOUND GUIDED TRANSPERNINEAL PLACEMENT OF SPACEOAR AND FIDUCIAL MARKERS    Physician: Litzy    Anesthesia: General    INDICATIONS AND CONSENT:    patient has a history of prostate cancer who now presents for treatment.  After the risks, benefits, alternatives and indications for the procedure were explained, he consented.     PROCEDURE:  The subject was positioned in the dorsal lithotomy position. A bilateral pudendal nerve block was performed using standard technique (1% lidocaine solution) The needle was advanced to the mid perineum region and an adequate dose of Lidocaine was injected. Once this area became anesthetized (~5 min), the needle was advanced until it was proximal to the pudendal nerve, and an additional dose of Lidocaine was injected. Once the area was completely anesthetized (~5 min), placement of SpaceOAR commence.  Intra-Service  Prior to needle insertion, an axial measurement of the space between the prostate (mid gland) and rectum was noted. SpaceOAR hydrogel was prepared as described in the ’s Instructions For Use while the patient was prepped. With the subject maintained in the dorsal lithotomy position, the transrectal ultrasound (TRUS) probe was positioned to enable visual guidance of the needle into the space between the prostate and the rectum.  Under transrectal ultrasound guidance, the 15 cm 18G needle was inserted through the rectourethralis muscle and the needle tip advanced into the perirectal fat inferior to the prostate all by using a transperineal approach and with side-fire transrectal ultrasound guidance. The needle position was confirmed in both sagittal and axial  fields. Saline was used to dissect the space between the Denonvilliers’ fascia and anterior rectal wall (“hydrodissection”). A space was created with hydrodissection.  With the needle tip at mid gland, the axial field was viewed to confirm the needle was not in the rectal wall (movement of the needle tip without corresponding movement of the rectal wall will confirm perirectal placement). While maintaining the desired position, aspiration was done to ensure that the needle was not in vascular space. The assembled SpaceOAR delivery system was then attached to the 18G needle.  Under ultrasound guidance (sagittal plane), a smooth, continuous injection technique was used to dispense the SpaceOAR hydrogel into the space between the prostate and rectum (Denonvilliers’ fascia and the anterior rectal wall). The entire syringe contents (10 mL total) were injected without stopping. Optimal visualization of the needle during hydrogel administration was maintained at all times.  An axial measurement of the space between the prostate (mid gland) and rectum immediately post-SpaceOAR injection was noted.  No suspected penetration or compromise of the rectal wall occurred.  Post-Service  He will continue ciprofloxacin 500 mg twice daily until gone and follow-up next week for his pre IMRT imaging for planning with Radiation Oncologist    The patient tolerated the procedure well.  There were no complications.     Attending Attestation: I was present and scrubbed for the entire procedure.    Pavan Mcghee  Phone Number: [phone]

## 2025-01-14 NOTE — ANESTHESIA PROCEDURE NOTES
Airway  Date/Time: 1/14/2025 1:18 PM  Urgency: elective      Staffing  Performed: attending   Authorized by: Jean Paul Moreno MD    Performed by: Jean Paul Moreno MD  Patient location during procedure: OR    Indications and Patient Condition  Preoxygenated: yes      Final Airway Details  Final airway type: endotracheal airway      Successful airway: ETT  Cuffed: yes   Successful intubation technique: video laryngoscopy  Blade size: #4  ETT size (mm): 8.0  Cormack-Lehane Classification: grade I - full view of glottis  Placement verified by: capnometry   Number of attempts at approach: 1

## 2025-01-14 NOTE — H&P
History Of Present Illness  Pavan Kramer is a 64 y.o. male presenting with prostate cancer.     Past Medical History  Past Medical History:   Diagnosis Date    GERD (gastroesophageal reflux disease)     Hyperlipidemia     Hypertension     Type 2 diabetes mellitus        Surgical History  Past Surgical History:   Procedure Laterality Date    HERNIA REPAIR Right     recurrent hernia repair with mesh    PROSTATE BIOPSY      SKIN GRAFT          Social History  He reports that he has never smoked. He has never used smokeless tobacco. He reports that he does not currently use alcohol. He reports current drug use. Frequency: 5.00 times per week. Drug: Marijuana.    Family History  Family History   Problem Relation Name Age of Onset    No Known Problems Mother      No Known Problems Father          Allergies  Patient has no known allergies.    Review of Systems   Constitutional:  Negative for chills and fever.   HENT: Negative.     Eyes: Negative.    Respiratory:  Negative for cough and shortness of breath.    Cardiovascular:  Negative for chest pain and leg swelling.   Gastrointestinal:  Negative for nausea.   Endocrine: Negative.    Genitourinary:  Negative for difficulty urinating.        Negative except for documented in HPI   Allergic/Immunologic: Negative.    Neurological:         Alert & oriented X 3   Hematological:         Denies blood thinners   Psychiatric/Behavioral: Negative.          Physical Exam  Vitals and nursing note reviewed.   Pulmonary:      Effort: Pulmonary effort is normal.   Abdominal:      Palpations: Abdomen is soft.      Tenderness: There is no abdominal tenderness.   Genitourinary:     Comments: Kidneys non palpable bilaterally  Bladder non palpable or tender  Neurological:      Mental Status: He is alert.          Last Recorded Vitals  Weight 88.5 kg (195 lb).      Assessment/Plan   Assessment & Plan  Prostate cancer (Multi)            Pavan Mcghee MD

## 2025-01-31 ENCOUNTER — HOSPITAL ENCOUNTER (OUTPATIENT)
Age: 65
Discharge: HOME | End: 2025-01-31
Payer: COMMERCIAL

## 2025-01-31 DIAGNOSIS — C61: Primary | ICD-10-CM

## 2025-01-31 PROCEDURE — A9575 INJ GADOTERATE MEGLUMI 0.1ML: HCPCS

## 2025-01-31 PROCEDURE — 72197 MRI PELVIS W/O & W/DYE: CPT

## 2025-01-31 NOTE — XMS RPT_ITS
Comprehensive CCD (C-CDA v2.1)  
  
                          Created on: 2025  
  
  
Xin Wesley  
External Reference #: CDR,PersonID:[id number]  
: 1960  
Sex: Male  
  
Demographics  
  
  
                                        Address             [address]  
[place]  [address]  
   
                                        Mobile Phone        [phone]  
   
                                        Home Phone          [phone]  
   
                                        Preferred Language  en  
   
                                        Marital Status        
   
                                        Spiritism Affiliation Unknown  
   
                                        Race                White  
   
                                        Ethnic Group        Not  or Lati  
no  
  
  
Author  
  
  
                                        Organization        [facility]  
  
  
Care Team Providers  
  
  
                                Care Team Member Name Role            Phone  
   
                                Aaron, Antonios Unavailable     [phone]  
   
                                MadsenByron oliveros     Unavailable     Unavailable  
   
                                Aaron MD, Cecilia Primary Care Provider [phone]  
[id number]  
   
                                Aaron, Antonios Unavailable     [phone]  
   
                                Unavailable     Unavailable     [phone]  
   
                                MD XIN SURESH Referring       Unavailabl  
e  
   
                                MD XIN SURESH Attending       Unavailabl  
e  
   
                                Aaron, Dr. Brooks Primary [facility]    Unavailable  
   
                                Aaron, Dr. Brooks Primary [facility]    Unavailable  
   
                                Xin Suresh      Referring       Unavailable  
   
                                Xin Suresh      Attending       Unavailable  
   
                                Aaron Cecilia PERSAUD Primary Care Provider [phone]  
[id number]  
   
                                Cecilia Walker MD Primary Care Provider [phone]  
[id number]  
   
                                SELF            Referring       Unavailable  
   
                                KARY LAMB Attending       Unavailabl  
e  
   
                                AARON, ANTONIOS Primary Care    Unavailable  
   
                                AARON, ANTONIOS Primary Care    Unavailable  
   
                                AARON, ANTONIOS Primary Care    Unavailable  
   
                                Aaron Cecilia PERSAUD Primary Care Provider [phone]  
[id number]  
   
                                Cecilia Walker MD Primary Care Provider [phone]  
[id number]  
   
                                XIN SURESH    Attending       Unavailable  
   
                                AARON, ANTONIOS Primary Care    Unavailable  
   
                                SURESHXIN    Attending       Unavailable  
   
                                AARON, ANTONIOS Primary Care    Unavailable  
   
                                SURESHXIN W    Attending       Unavailable  
   
                                AARON, ANTONIOS Primary Care    Unavailable  
   
                                SURESHXIN    Attending       Unavailable  
   
                                AARON, ANTONIOS Primary Care    Unavailable  
   
                                SURESHXIN W    Referring       Unavailable  
   
                                AARON, ANTONIOS Primary Care    Unavailable  
   
                                SURESHXIN W    Admitting       Unavailable  
   
                                SURESHXIN W    Attending       Unavailable  
   
                                AARON, ANTONIOS Primary Care    Unavailable  
   
                                SURESH, XIN W    Admitting       Unavailable  
   
                                SURESH, XIN W    Attending       Unavailable  
   
                                AARON, ANTONIOS Primary Care    Unavailable  
  
  
  
Allergies  
  
  
                                                    Allergy   
Classification                          Reported   
Allergen(s)               Allergy Type              Date of   
Onset                     Reaction(s)               Facility  
   
                                                      
(8 sources)                             Amoxicillin /   
Clavulanate;   
Translations:   
[AMOXICILLIN-POT   
CLAVULANATE]    Drug Allergy    10-      Unknown         [facility]  
Work Phone:   
[phone]  
  
  
  
Medications  
Current Medications  
  
  
  
                      Medication Drug Class(es) Dates      Sig (Normalized) Sig   
(Original)  
   
                                                    acetaminophen 325 mg   
/ HYDROcodone   
bitartrate 5 mg oral   
tablet  
(4 sources)               Opioid Agonist            Start:   
2024                              take 1 tablet by   
mouth every six   
hours for pain                          HYDROcodone-acet  
aminophen   
(Norco) 5-325 mg   
tablet   
Indications:   
Prostate cancer   
(Multi) Take 1   
tablet by mouth   
every 6 hours if   
needed for   
severe pain (7 -   
10). 20 tablet   
2025   
Active  
   
                                                    aspirin 81 mg   
delayed release oral   
tablet  
(10 sources)                            Platelet   
Aggregation   
Inhibitor,   
Nonsteroidal   
Anti-inflammatory   
Drug                                                take 1 tablet by   
mouth once                              aspirin 81 mg EC   
tablet Take 1   
tablet (81 mg)   
by mouth. TAKE   
PER DIRECTED   
Active  
   
                                        Comment on above:   Take by mouth.   
   
                                                    calcium chloride   
0.0014 meq/ml /   
potassium chloride   
0.004 meq/ml /   
sodium chloride   
0.103 meq/ml /   
sodium lactate 0.028   
meq/ml injectable   
solution  
(2 sources)                                         Start:   
2025  
End:   
01-                              take 100 mL   
intravenously every   
hour                                    100 mL/hr,   
intravenous,   
Continuous,   
Starting on 25 at 1415,   
For 1 day,   
Recovery (only)  
  
  
  
                                                    Start: 2024  
End: 2024                         take 100 mL intravenously every   
hour                                    100 mL/hr, intravenous,   
Continuous, Starting on 24 at 0830, For 1 day,   
Recovery (only)  
  
  
  
                                                    ciprofloxacin 500   
mg oral tablet  
(2 sources)                             Quinolone   
Antimicrobial                           Start:   
2025  
End: 2025                         take 0.5   
tablet by   
mouth twice   
daily                                   ciprofloxacin   
(Cipro) 500 mg   
tablet Indications:   
Prostate cancer   
(Multi) Take 0.5   
tablets (250 mg) by   
mouth 2 times a day   
for 3 days. 3   
tablet 2025 Active  
  
  
  
                                                    Start: 10-  
End: 2024                         take 1 tablet by mouth twice   
daily                                   ciprofloxacin (Cipro) 500 mg tablet   
Indications: Elevated PSA Take 1   
tablet (500 mg) by mouth 2 times a   
day for 3 days. 6 tablet 10/31/2024   
2024 Active  
  
  
  
                                                    gabapentin 300 mg   
oral capsule  
(8 sources)               Anti-epileptic Agent      Start:   
2018                              take 1 capsule   
by mouth every   
eight hours                             gabapentin   
(Neurontin) 300   
mg capsule 1   
capsule (300 mg)   
every 8 hours.   
2018   
Active  
   
                                        Comment on above:   q 8 HR.   
   
                                                    glimepiride 2 mg   
oral tablet  
(1 source)          Sulfonylurea                            take 1 tablet   
by mouth once   
daily before   
mealtime                                glimepiride   
(Amaryl) 2 mg   
tablet Take 1   
tablet (2 mg) by   
mouth once daily   
in the morning.   
Take before   
meals. Active  
   
                                                    labetalol   
hydrochloride 5   
mg/ml injectable   
solution  
(2 sources)                             beta-Adrenergic   
Blocker                                 Start:   
2025                                          5 mg,   
intravenous,   
Administer over   
1 Minutes, Once   
as needed,   
systolic blood   
pressure greater   
than 180 mmHg,   
dystolic blood   
pressure greater   
than 100 mmHg   
and heart rate   
greater than 60   
BPM, Starting on   
25 at   
1346, For 1   
dose, Recovery   
(only)  
  
  
  
                                Start: 2024                 5 mg, intraven  
ous, Administer over 1 Minutes, Once as needed,  
  
systolic blood pressure greater than 180 mmHg, dystolic blood   
pressure greater than 100 mmHg and heart rate greater than 60   
BPM, Starting on 24 at 0809, For 1 dose, Recovery   
(only)  
  
  
  
                                                    metoprolol tartrate   
50 mg oral tablet  
(1 source)          beta-Adrenergic Blocker                     take 1 tablet   
by mouth once   
daily                                   metoprolol tartrate   
(Lopressor) 50 mg   
tablet Take 1   
tablet by mouth   
once daily. Active  
   
                                                    1 ml morphine   
sulfate 4 mg/ml   
prefilled syringe  
(4 sources)               Opioid Agonist            Start:   
                                                   4 mg, intravenous,   
Every 5 min PRN,   
pain severe (7-10),   
first line,   
Starting on 25 at 1346,   
Recovery (only),   
Max total of 20 mg   
regardless of dose.  
  
  
  
                                Start: 2025                 2 mg, intraven  
ous, Every 5 min PRN, pain moderate (4-6),   
first   
line, Starting on 25 at 1346, Recovery (only), Max total   
of 20 mg regardless of dose.  
   
                                Start: 2024                 2 mg, intraven  
ous, Every 5 min PRN, pain moderate (4-6),   
first   
line, Starting on 24 at 0809, Recovery (only), Max   
total of 20 mg regardless of dose.  
   
                                Start: 2024                 4 mg, intraven  
ous, Every 5 min PRN, pain severe (7-10), first  
  
line, Starting on 24 at 0809, Recovery (only), Max   
total of 20 mg regardless of dose.  
  
  
  
                                                    Multivitamin preparation  
(8 sources)                                                     multivitamin (MU  
LTIPLE VITAMINS ORAL) Take by mouth.   
TAKE PER DIRECTED Active  
  
  
  
                                                                multivitamin (MU  
LTIPLE VITAMINS ORAL) Take   
by mouth. TAKE PER DIRECTED 0 Active  
   
                                                            take 1 tablet by pako  
th once daily in   
the evening                             Multiple Vitamins oral tablet ; 1 tab(s)  
   
orally once a day (in the evening) Quantity:   
0 Refills: 0 Ordered: 2022 Alec,   
Serene Generic Substitution Allowed  
  
  
  
                                                    2 ml ondansetron 2   
mg/ml injection  
(3 sources)                             Serotonin-3 Receptor   
Antagonist                              Start: 2025  
End: 2025                                     4 mg, intravenous, Once   
as needed,   
nausea/vomiting, first   
line, Starting on 25 at 1346, For 1   
dose, Recovery (only),   
When administering via IV   
Push, administer over 3-5   
minutes.  
  
  
  
                                                    Start: 2024  
End: 2024                                     4 mg, intravenous, Once as n  
eeded, nausea/vomiting, first line,  
  
Starting on 24 at 0809, For 1 dose, Recovery (only),   
When administering via IV Push, administer over 3-5 minutes.  
  
  
  
                                                    oxyCODONE   
hydrochloride 5 mg   
oral tablet  
(2 sources)         Opioid Agonist      Start: 2025   take 1 tablet   
by mouth   
every four   
hours as   
needed                                  5 mg, oral, Every 4   
hours PRN, pain mild   
(1-3), first line,   
Starting on 25 at 1346,   
Recovery (only),   
When able to take   
oral medications.,   
If ordered PRN for   
pain, nurse is   
permitted to   
administer this   
medication for   
higher pain scores   
based on patient   
preference? Yes  
  
  
  
                                        Start: 2024   take 1 tablet by pako  
th every   
four hours as needed                    5 mg, oral, Every 4 hours PRN, pain   
mild (1-3), first line, Starting on   
24 at 0809, Recovery   
(only), When able to take oral   
medications., If ordered PRN for   
pain, nurse is permitted to   
administer this medication for higher   
pain scores based on patient   
preference? Yes  
  
  
  
                                                    pantoprazole 40 mg   
delayed release oral   
tablet  
(11 sources)                            Proton Pump   
Inhibitor                                           take 1 tablet by   
mouth once daily                        pantoprazole (ProtoNix)   
40 mg EC tablet Take 1   
tablet (40 mg) by mouth   
once daily. Active  
  
  
  
                                                                pantoprazole DR   
(PROTONIX) 40 mg tablet Take by mouth every 24 hours. 0 Active  
   
                                                                Pantoprazole Sod  
ium 40 MG Oral Tablet Delayed Release Quantity: 0 Refills: 0   
Ordered: 30-Aug-2023 DO Active  
  
  
  
                                        Comment on above:   Source=S[facility]cripts,   
Medication=PANTOPRAZOLE SOD DR 40 MG TAB,   
OriginatingSource=[facility], OriginatingProvider=AARON,   
ANTONIOS, Duration=90, Date Last Modified/Filled=2022   
   
                                                            Take by mouth every   
24 hours.   
   
                                                    phenazopyridine   
hydrochloride 200 mg   
oral tablet  
(1 source)                                          Star  
t:   
                                      take 1   
tablet by   
mouth   
three   
times   
daily as   
needed for   
muscle   
spasms                                  phenazopyridine   
(Pyridium) 200 mg   
tablet Indications:   
Prostate cancer   
(Multi) Take 1   
tablet (200 mg) by   
mouth 3 times a day   
as needed for   
bladder spasms. 30   
tablet 2025   
Active  
   
                                                    promethazine   
(Phenergan) 6.25 mg   
in sodium chloride   
0.9% 50 mL IV  
(2 sources)                                         Star  
t:   
                                                  6.25 mg,   
intravenous,   
Administer over 15   
Minutes, Once as   
needed,   
Nausea/vomiting,   
second line,   
Starting on 25 at 1346,   
For 1 dose,   
Recovery (only)  
  
  
  
                                Start: 2024                 6.25 mg, intra  
venous, Administer over 15 Minutes, Once as   
needed,   
Nausea/vomiting, second line, Starting on 24 at 0809,   
For 1 dose, Recovery (only)  
  
  
  
Completed/Discontinued Medications  
  
  
  
                      Medication Drug Class(es) Dates      Sig (Normalized) Sig   
(Original)  
   
                                                    acetaminophen 325 mg   
oral tablet  
(2 sources)                                         Start:   
2025  
End:   
2025                              take 975 mg by   
mouth once as   
needed for pain                         975 mg, oral,   
Once, On 25 at 1145,   
For 1 dose,   
Preprocedure,   
Administer with   
small amount of   
water   
preoperatively.,   
If ordered PRN   
for pain, nurse   
is permitted to   
administer this   
medication for   
higher pain   
scores based on   
patient   
preference? Yes  
  
  
  
                                                    Start: 2024  
End: 2024                         take 975 mg by mouth once as   
needed for pain                         975 mg, oral, Once, On 24 at   
0630, For 1 dose, Preprocedure,   
Administer with small amount of water   
preoperatively., If ordered PRN for   
pain, nurse is permitted to administer   
this medication for higher pain scores   
based on patient preference? Yes  
  
  
  
                                                    acetaminophen 325   
mg / oxyCODONE   
hydrochloride 5 mg   
oral tablet  
(4 sources)                             Opioid   
Agonist                                 Start:   
2016                              take 1-2   
tablets by   
mouth every   
four hours   
as needed                               oxyCODONE-acetaminophen   
(PERCOCET) 5-325 mg tablet   
Take 1-2 tablets by mouth   
every 4 hours as needed for   
Pain. 40 tablet 0 2016   
Active  
  
  
  
                                        Start: 2016   take 1-2 tablets by   
mouth   
every six hours as needed   
for pain                                oxyCODONE-acetaminophen (PERCOCET) 5-325  
   
mg tablet Indications: Unilateral   
inguinal hernia without obstruction or   
gangrene, recurrence not specified Take   
1-2 tablets by mouth every 6 hours as   
needed for Pain. 30 tablet 0 2016   
Active  
  
  
  
                                        Comment on above:   Take 1-2 tablets by   
mouth every 6 hours as needed for Pain.   
   
                                                            Take 1-2 tablets by   
mouth every 4 hours as needed for Pain.   
   
                                                    cefTRIAXone 2000 mg   
injection  
(1 source)                              Cephalosporin   
Antibacterial                           Start:   
20  
End:   
20                                                  2 g, intravenous,   
at 100 mL/hr,   
Administer over 30   
Minutes, Once, On   
24 at   
0845, For 1 dose,   
premix bag,   
Suspected   
Indication (Select   
all that apply):   
Surgical   
Prophylaxis,   
Indications:   
Surgical   
Prophylaxis  
   
                                                    1 ml dexamethasone   
phosphate 10 mg/ml   
injection  
(1 source)                Corticosteroid            Start:   
20  
End:   
20  
25                                                  8 mg, intravenous,   
Once, On 25 at 1145,   
For 1 dose,   
Preprocedure  
   
                                                    diazePAM 10 mg oral   
tablet  
(2 sources)               Benzodiazepine            Start:   
20                                      take 1 tablet   
by mouth every   
hour                                    diazePAM 10 MG Oral   
Tablet TAKE 1   
TABLET 1 HOUR PRIOR   
TO PROCEDURE.   
Quantity: 1   
Refills: 0 Ordered:   
30-Aug-2023 Xin Suresh II, MD Start :   
30-Aug-2023 Active  
   
                                                    docusate sodium 100   
mg oral capsule  
(2 sources)                                         Start:   
20  
16                                      take 1 capsule   
by mouth twice   
daily                                   docusate sodium   
(COLACE) 100 mg   
capsule Take 1   
capsule by mouth   
twice daily. 20   
capsule 0   
2016 Active  
   
                                        Comment on above:   Take 1 capsule by mo  
uth twice daily.   
   
                                                    doxycycline hyclate   
100 mg oral tablet  
(1 source)                              Tetracycline-class   
Drug                                    Start:   
20  
End:   
20                                      take 1 tablet   
by mouth twice   
daily                                   doxycycline hyclate   
100 mg oral tablet   
; 1 tab(s) orally 2   
times a day   
Quantity: 20   
Refills: 0 Ordered:   
2021 Candice Talavera Start:   
2021 End:   
2021 Status:   
Completed Generic   
Substitution   
Allowed Comments:   
Avoid prolonged or   
excessive exposure   
to direct and/or   
artificial sunlight   
while taking this   
medication.Do not   
take this drug if   
you are   
pregnant.Finish all   
this medication   
unless otherwise   
directed by   
prescriber.Medicati  
on should be taken   
with plenty of   
water.  
   
                                        Comment on above:   Avoid prolonged or e  
xcessive exposure to direct and/or   
artificial sunlight while taking this medication.Do not take   
this drug if you are pregnant.Finish all this medication unless   
otherwise directed by prescriber.Medication should be taken with   
plenty of water.   
   
                                                    empagliflozin 25 mg   
oral tablet  
(12 sources)                            Sodium-Glucose   
Cotransporter 2   
Inhibitor                               Start:   
20                                                  Jardiance 25 MG   
Oral Tablet   
Quantity: 90   
Refills: 0 Ordered:   
22-Aug-2023 DO   
Start : 22-Aug-2023   
Active  
  
  
  
                                                take 1 tablet by mouth once lilia  
y empagliflozin (Jardiance) 10 mg Take 1 tablet  
(10   
mg) by mouth once daily. TAKE PER DIRECTED Active  
  
  
  
                                        Comment on above:   Source=Surescripts,   
Medication=JARDIANCE 10 MG TABLET,   
OriginatingSource=[facility], OriginatingProvider=AARON,   
ANTONIOS, Duration=90, Date Last Modified/Filled=2022   
   
                                                    erythromycin 0.005   
mg/mg ophthalmic   
ointment  
(1 source)                              Macrolide, Macrolide   
Antimicrobial                           S  
t  
a  
r  
t  
:   
0  
1  
-  
2  
0  
-  
2  
0  
2  
1  
E  
n  
d  
:   
0  
1  
-  
2  
6  
-  
2  
0  
2  
1                                                   erythromycin 0.5%   
ophthalmic ointment   
; 1 application in   
each affected eye 4   
times a day   
Quantity: 1 Refills:   
0 Ordered:   
2021 Candice Talavera Start:   
2021 End:   
2021 Status:   
Completed Generic   
Substitution Allowed   
Comments: For the   
eye.  
   
                                        Comment on above:   For the eye.   
   
                                                    gadoterate meglumine   
(Dotarem) 0.5 mmol/mL   
contrast injection 18   
mL  
(1 source)                                          S  
t  
a  
r  
t  
:   
1  
0  
-  
2  
3  
-  
2  
0  
2  
4  
E  
n  
d  
:   
1  
0  
-  
2  
3  
-  
2  
0  
2  
4                                       inject 18 mL   
intravenously   
once                                    18 mL, intravenous,   
Once in imaging,   
Starting on Wed   
10/23/24 at 1718,   
For 1 dose,   
Administer undiluted   
as rapid I.V. bolus   
injection  
   
                                                    hydroCHLOROthiazide   
25 mg oral tablet  
(13 sources)              Thiazide Diuretic         S  
t  
a  
r  
t  
:   
0  
8  
-  
2  
2  
-  
2  
0  
2  
3                                                   hydroCHLOROthiazide   
25 MG Oral Tablet   
Quantity: 90   
Refills: 0 Ordered:   
22-Aug-2023 DO Start   
: 22-Aug-2023 Active  
  
  
  
                                                take 1 tablet by mouth once lilia  
y hydroCHLOROthiazide (HYDRODiuril) 25 mg   
tablet   
Take 1 tablet (25 mg) by mouth once daily. TAKE   
PER DIRECTED Active  
   
                                                                hydroCHLOROthiaz  
benita Quantity: 0 Refills: 0   
Ordered: 2021 Bhavana Ayala Status:   
Completed Generic Substitution Allowed  
  
  
  
                                        Comment on above:   Source=[facility],   
Medication=HYDROCHLOROTHIAZIDE 25 MG TAB,   
OriginatingSource=[facility] [facility], OriginatingProvider=AARON,   
ANTONIOS, Duration=90, Date Last Modified/Filled=10-Apr-2022   
   
                                                            Take 25 mg by mouth   
once daily.   
   
                                                    ibuprofen 600 mg   
oral tablet  
(1 source)                Nonsteroidal Anti-inflammatory Drug Start  
:   
  
End:   
                                   take 1   
tablet by   
mouth   
every six   
hours                                   ibuprofen 600 mg   
oral tablet ; 1   
tab(s) orally   
every 6 hours   
Quantity: 40   
Refills: 0   
Ordered:   
2021 Candice Talavera Start:   
2021 End:   
2021   
Status: Completed   
Generic   
Substitution   
Allowed Comments:   
Do not take this   
drug if you are   
pregnant.It is   
very important   
that you take or   
use this exactly   
as directed. Do   
not skip doses or   
discontinue unless   
directed by your   
doctor.May cause   
drowsiness or   
dizziness.Obtain   
medical advice   
before taking any   
non-prescription   
drugs as some may   
affect the action   
of this   
medication.Take   
with food or milk.  
   
                                        Comment on above:   Do not take this francisco  
g if you are pregnant.It is very important  
that   
you take or use this exactly as directed. Do not skip doses or   
discontinue unless directed by your doctor.May cause drowsiness or   
dizziness.Obtain medical advice before taking any non-prescription   
drugs as some may affect the action of this medication.Take with   
food or milk.   
   
                                                    100 ml   
levoFLOXacin 5   
mg/ml injection  
(1 source)                Quinolone Antimicrobial   Start  
:   
  
End:   
                                               500 mg,   
intravenous, at   
100 mL/hr,   
Administer over 60   
Minutes, Once, On   
25 at   
1115, For 1 dose,   
Intraprocedure,   
premix bag, Dosing   
of this medication   
varies based on   
severity of   
illness. Does this   
patient have   
sepsis or concern   
for sepsis   
(probable or   
documented   
infection plus   
systemic   
manifestations of   
infection)? No,   
Suspected   
Indication (Select   
all that apply):   
Surgical   
Prophylaxis,   
Indications:   
Surgical   
Prophylaxis  
   
                                                    lisinopril 40 mg   
oral tablet  
(13 sources)                            Angiotensin Converting Enzyme   
Inhibitor                               Start  
:   
                                               Lisinopril 40 MG   
Oral Tablet   
Quantity: 90   
Refills: 0   
Ordered:   
22-Aug-2023 DO   
Start :   
22-Aug-2023 Active  
  
  
  
                                                take 1 tablet by mouth once lilia  
y lisinopril 40 mg tablet Take 1 tablet (40 mg)  
by   
mouth once daily. Active  
   
                                                                lisinopril Quant  
ity: 0 Refills: 0 Ordered:   
2021 Bhavana Ayala Status: Completed   
Generic Substitution Allowed  
  
  
  
                                        Comment on above:   Source=Surescripts,   
Medication=LISINOPRIL 40 MG TABLET,   
OriginatingSource=[facility], OriginatingProvider=AARON,   
ANTONIOS, Duration=90, Date Last Modified/Filled=2022   
   
                                                            Take 40 mg by mouth   
once daily.   
   
                                                    Medrol Dosepak 4 mg   
oral tablet  
(1 source)                                          Start:   
20                                                  Medrol Dosepak 4 mg   
oral tablet ; 1   
cap(s) orally   
Quantity: 1   
Refills: 0 Ordered:   
2022 Byron Madsen Start:   
2022 Generic   
Substitution   
Allowed Comments:   
It is very   
important that you   
take or use this   
exactly as   
directed. Do not   
skip doses or   
discontinue unless   
directed by your   
doctor.Obtain   
medical advice   
before taking any   
non-prescription   
drugs as some may   
affect the action   
of this   
medication.Take   
with food or milk.  
   
                                        Comment on above:   It is very important  
 that you take or use this exactly as   
directed. Do not skip doses or discontinue unless directed by   
your doctor.Obtain medical advice before taking any   
non-prescription drugs as some may affect the action of this   
medication.Take with food or milk.   
   
                                                    metFORMIN   
hydrochloride 1000 mg   
oral tablet  
(13 sources)              Biguanide                 Start:   
20  
23                                                  metFORMIN HCl -   
1000 MG Oral Tablet   
Quantity: 180   
Refills: 0 Ordered:   
22-Aug-2023 DO   
Start : 22-Aug-2023   
Active  
  
  
  
                                                take 1 tablet by mouth twice kike  
ly metFORMIN (Glucophage) 1,000 mg tablet Take   
1 tablet (1,000 mg) by mouth twice a day.   
TAKE PER DIRECTED Active  
   
                                                            take 1 tablet by pako  
 twice daily at   
mealtime                                metFORMIN (GLUCOPHAGE) 500 mg tablet Ahmet  
e   
500 mg by mouth twice daily with meals. 0   
Active  
   
                                                                metFORMIN Quanti  
ty: 0 Refills: 0 Ordered:   
2021 Bhavana Ayala Status:   
Completed Generic Substitution Allowed  
  
  
  
                                        Comment on above:   Source=Surescripts,   
Medication=METFORMIN HCL 1,000 MG TABLET,   
OriginatingSource=[facility], OriginatingProvider=CECILIA WALKER, Duration=90, Date Last Modified/Filled=10-Apr-2022   
   
                                                            Take 500 mg by mouth  
 twice daily with meals.   
   
                                                    5 ml midazolam 1   
mg/ml injection  
(1 source)                Benzodiazepine            Start:   
2025  
End:   
2025                                          2 mg,   
intravenous,   
Once, On 25 at   
1245, For 1   
dose  
  
  
  
                                                    Start: 2025  
End: 2025                                     2 mg, intravenous, Once, On   
25 at 1245, For 1 dose  
  
  
  
                                                    Multivitamin capsule  
(2 sources)                                                 take 1 capsule   
by mouth once   
daily                                   Multivitamin capsule   
Take 1 capsule by   
mouth once daily. 0   
Active  
   
                                        Comment on above:   Take 1 capsule by mo  
uth once daily.   
   
                                                    phenylephrine   
hydrochloride 25   
mg/ml ophthalmic   
solution  
(1 source)                              alpha-1 Adrenergic   
Agonist                                 Start:   
20  
24  
End:   
20  
24                                                  PHENYLephrine 2.5 %   
1 Drop (AK-DILATE,   
ROCKY-SYNEPHRINE)  
   
                                                    predniSONE 20 mg oral   
tablet  
(1 source)                                          Start:   
20  
21  
End:   
20  
21                                      take 1 tablet   
by mouth once   
daily at   
mealtime                                predniSONE 20 mg   
oral tablet ; 1   
tab(s) orally once a   
day Quantity: 6   
Refills: 0 Ordered:   
2021 IlanWilfredoCandice Start:   
2021 End:   
2021 Status:   
Completed Generic   
Substitution Allowed   
Comments: It is very   
important that you   
take or use this   
exactly as directed.   
Do not skip doses or   
discontinue unless   
directed by your   
doctor.Obtain   
medical advice   
before taking any   
non-prescription   
drugs as some may   
affect the action of   
this medication.Take   
with food or milk.  
   
                                        Comment on above:   It is very important  
 that you take or use this exactly as   
directed. Do not skip doses or discontinue unless directed by   
your doctor.Obtain medical advice before taking any   
non-prescription drugs as some may affect the action of this   
medication.Take with food or milk.   
   
                                                    psyllium 3400 mg   
powder for oral   
suspension  
(2 sources)                                                     Psyllium   
Seed-Sucrose powd   
Take 1 Tablespoonful   
by mouth once daily.   
0 Active  
   
                                        Comment on above:   Take 1 Tablespoonful  
 by mouth once daily.   
   
                                                    simvastatin 40 mg   
oral tablet  
(13 sources)                            HMG-CoA Reductase   
Inhibitor                               Start:   
20                                                  Simvastatin 40 MG   
Oral Tablet   
Quantity: 90   
Refills: 0 Ordered:   
26-Aug-2023 DO Start   
: 22-Aug-2023 Active  
  
  
  
                                                            take 1 tablet by pako  
th once daily at   
bedtime                                 simvastatin (Zocor) 40 mg tablet Take 1   
tablet (40 mg) by mouth once daily at   
bedtime. Active  
   
                                                                simvastatin Raad  
tity: 0 Refills: 0 Ordered:   
2021 Bhavana Ayala Status:   
Completed Generic Substitution Allowed  
  
  
  
                                        Comment on above:   Source=Surescripts,   
Medication=SIMVASTATIN 40 MG TABLET,   
OriginatingSource=[facility], OriginatingProvider=AARON,   
ANTONIOS, Duration=90, Date Last Modified/Filled=2022   
   
                                                            Take 40 mg by mouth   
daily at bedtime.   
   
                                                    tropicamide 10 mg/ml   
ophthalmic solution  
(2 sources)               Anticholinergic           Start:   
20  
End:   
20                                                  tropicamide 1 % 1   
Drop (MYDRIACYL)  
  
  
  
                                                    Start: 10-  
End: 10-                                     tropicamide 0.5 % 1 Drop (MY  
DRIACYL)  
  
  
  
Problems  
Active Problems  
  
  
                                                    Problem   
Classification  Problem         Date            Documented Date Episodic/Chronic  
   
                                                    Acute cerebrovascular   
disease  
(2 sources)                             Acute cerebrovascular   
disease                                 2022            
   
                                        Comment on above:   STROKE SYM   
   
                                                    Anxiety disorders  
(2 sources)                             Anxiety; Translations:   
[Anxiety state,   
unspecified]                            2022          Chronic  
   
                                                    Blindness and vision   
defects  
(6 sources)                             Bilateral hyperopia of   
eyes; Translations:   
[Hypermetropia,   
bilateral]                                                  Episodic  
   
                                                    Cancer of prostate  
(11 sources)                            Malignant tumor of   
prostate;   
Translations:   
[Malignant neoplasm of   
prostate]                               Onset:   
2024                Chronic  
   
                                                    Cataract  
(1 source)                              Bilateral senile   
combined form   
cataracts of eyes;   
Translations:   
[Combined forms of   
age-related cataract,   
bilateral]                              2024          Chronic  
   
                                                    Diabetes mellitus   
without complication  
(8 sources)                             Diabetes mellitus type   
2 without retinopathy;   
Translations: [Type 2   
diabetes mellitus   
without complications]                  Onset:   
2016                                          Chronic  
   
                                                    Disorders of lipid   
metabolism  
(2 sources)                             Hypercholesterolemia;   
Translations: [Pure   
hypercholesterolemia,   
unspecified]                            Onset:   
2016                Chronic  
   
                                                    Esophageal disorders  
(2 sources)                             Gastroesophageal   
reflux disease;   
Translations:   
[Gastro-esophageal   
reflux disease without   
esophagitis]                            Onset:   
2016                Chronic  
   
                                                    Essential   
hypertension  
(8 sources)                             Hypertensive disorder;   
Translations:   
[Unspecified essential   
hypertension]                           Onset:   
2016                Chronic  
   
                                                    Hyperplasia of   
prostate  
(15 sources)                            Benign prostatic   
hypertrophy with   
outflow obstruction;   
Translations: [Benign   
localized hyperplasia   
of prostate with   
urinary obstruction   
and other lower   
urinary tract symptoms   
(LUTS)]                                 Onset:   
09-                02-                Chronic  
   
                                                    Other connective   
tissue disease  
(1 source)                              Neurological symptom;   
Translations: [Other   
symptoms involving   
nervous and   
musculoskeletal   
systems]                                2022          Episodic  
   
                                                    Other eye disorders  
(2 sources)                             Asteroid hyalosis of   
left eye;   
Translations:   
[Crystalline deposits   
in vitreous body, left   
eye]                                                        Chronic  
   
                                                    Other eye disorders  
(1 source)                              Vitreous floaters of   
right eye;   
Translations: [Other   
vitreous opacities,   
right eye]                              2024          Chronic  
   
                                                    Other male genital   
disorders  
(13 sources)                            Male erectile   
dysfunction,   
unspecified;   
Translations:   
[Impotence of organic   
origin]                                 Onset:   
10-                02-                Chronic  
   
                                                    Other nervous system   
disorders  
(2 sources)                             Bell's palsy;   
Translations: [Bell's   
palsy]                                  2022          Episodic  
   
                                                    Other screening for   
suspected conditions   
(not mental disorders   
or infectious   
disease)  
(20 sources)                            Raised prostate   
specific antigen;   
Translations:   
[Elevated prostate   
specific antigen   
[PSA]]                                  Onset:   
09-                                          Episodic  
   
                                                    Unclassified  
(1 source)      Stroke-like symptoms                 2022        
  
  
Past or Other Problems  
  
  
                      Problem Classification Problem    Date       Documented Da  
te Episodic/Chronic  
   
                                                    Genitourinary symptoms   
and ill-defined   
conditions  
(16 sources)                            Nocturia;   
Translations:   
[Nocturia]                              Onset:   
09-                02-                Episodic  
   
                                                    Other diseases of   
kidney and ureters  
(2 sources)                             Other obstructive   
and reflux   
uropathy;   
Translations:   
[Other obstructive   
and reflux   
uropathy]                               Onset:   
10-                                          Episodic  
  
  
  
Results  
  
  
                          Test Name    Value        Interpretation Reference   
Range                                   Facility  
   
                                                    Glucose Test strip manual (B  
ld) [Mass/Vol]on 2025   
   
                      Glucose [Mass/Vol] 217 mg/dL  High       74 - 99 mg/dL [facility]  
   
                                                    Interpretation and   
review of   
laboratory results Abnormal                                        [facility]  
   
                      Glucose [Mass/Vol] 217 mg/dL  High       74-99      [facility]  
   
                                        Comment on above:   Performed By: #### 2  
[id number] ####  
SOHAN LOYA ([id number])  
[facility] ([facility])  
[address] [place] [address]   
   
                                                    Glucose Test strip manual (B  
ld) [Mass/Vol]on 2024   
   
                      Glucose [Mass/Vol] 154 mg/dL  High       74 - 99 mg/dL [facility]  
   
                                                    Interpretation and   
review of   
laboratory results Abnormal                                        [facility]  
   
                      Glucose [Mass/Vol] 154 mg/dL  High       74-99      [facility]  
   
                                        Comment on above:   Performed By: #### 2  
[id number] ####  
SOHAN LOYA ([id number])  
[facility] ([facility])  
[address] [place] [address]   
   
                                                    Surgical pathology studyon 1  
2024   
   
                                                    Surgical pathology   
study                                   Pathology report.total  
SEE COMMENT  
Surgical Pathology Case:   
[id number]  
Authorizing Provider:   
Xin Suresh MD   
Collected: 2024   
0745  
Ordering Location: [facility]   
Received: 2024   
1643  
Center OR  
Pathologist: León Robert MD  
Specimens: A) - PROSTATE   
NEEDLE BIOPSY RIGHT  
B) - PROSTATE NEEDLE   
BIOPSY LEFT  
C) - PROSTATE BIOPSY   
TARGETED ZULMA, AREA OF   
INTEREST#1  
Path report.final   
diagnosis  
SEE COMMENT  
A. Prostate, right,   
biopsy:  
-- PROSTATIC   
ADENOCARCINOMA, ACINAR   
TYPE, VLAD SCORE 3 +   
4 = 7, GRADE GROUP 2,   
INVOLVING THREE OF SIX   
FRAGMENTS AND   
APPROXIMATELY 5% OF THE   
TISSUE SUBMITTED (SEE   
NOTE).  
Note: Boydton pattern 4   
(poorly-formed glands)   
comprises approximately   
30-40% of tumor volume.  
B. Prostate, left,   
biopsy:  
-- PROSTATIC   
ADENOCARCINOMA, ACINAR   
TYPE, VLAD SCORE 3 +   
4 = 7, GRADE GROUP 2,   
INVOLVING ONE OF SIX   
FRAGMENTS AND   
APPROXIMATELY 5% OF THE   
TISSUE SUBMITTED (SEE   
NOTE).  
Note: Boydton pattern 4   
(poorly-formed glands)   
comprises approximately   
5% of tumor volume.  
C. Prostate, area of   
interest #1, biopsy:  
-- BENIGN PROSTATIC   
TISSUE.  
Note: PIN4 cocktail   
immunohistochemical   
stain (P63, 34BE12,   
AMACR) is performed, and   
supports the above   
diagnosis.  
The International   
Society of Urologic   
Pathologists has   
developed a prostate   
cancer grading system   
(the Grade Group System)   
which has been accepted   
by the World Health   
Organization. Shown   
below is a correlation   
between conventional   
Boydton grading/scoring   
and the new Grade Group   
system:  
? Grade Group 1 (Boydton   
score <=6)  
? Grade Group 2 (Boydton   
score 3+4=7)  
? Grade Group 3 (Vlad   
score 4+3=7)  
? Grade Group 4 (Vlad   
score 8)  
? Grade Group 5 (Vlad   
scores 9-10)  
Electronically signed by   
León Robert MD on   
2024 at 12:03 PM  
Laboratory comment  
By the signature on this   
report, the individual   
or group listed as   
making the Final   
Interpretation/Diagnosis   
certifies that they have   
reviewed this case.  
Path report.relevant Hx  
SEE COMMENT  
Pre-op diagnosis:  
Elevated PSA [R97.20]  
Path report.gross   
observation  
SEE COMMENT  
A: Received in formalin,   
labeled with the   
patient's name and   
hospital number, are two   
cylindrical fragments of   
tan soft tissue   
measuring 2.0 cm, 1.8   
cm, 1.0 cm, 1.0 cm, 1.0   
cm, and 0.9 cm in length   
by less than 0.1 cm in   
diameter. The specimen   
is submitted in toto in   
one cassette.  
JEK  
B: Received in formalin,   
labeled with the   
patient's name and   
hospital number, are two   
cylindrical fragments of   
tan soft tissue   
measuring 1.7 cm, 1.3   
cm, 1.3 cm, 1.2 cm, 1.1   
cm, and 1.0 cm in length   
by less than 0.1 cm in   
diameter. The specimen   
is submitted in toto in   
one cassette.  
MARIELAK  
C: Received in formalin,   
labeled with the   
patient's name and   
hospital number, are   
multiple cylindrical   
fragments of tan soft   
tissue measuring 2.1 cm,   
2.0 cm, 1.5 cm, 1.5 cm,   
and 1.4 cm in length by   
less than 0.1 cm in   
diameter. The specimen   
is submitted in toto in   
one cassette.  
JEK  
LAB AP ASR DISCLAIMER  
One or more of the   
reagents used to perform   
assays on this specimen   
MAY have contained   
components considered to   
be analyte specific   
reagents (ASR's). ASR's   
have not been cleared or   
approved by the U.S.   
Food and Drug   
Administration. These   
assays were developed   
and their performance   
characteristics   
determined by the   
Department of Pathology   
at [facility]. The FDA does not   
require this test to go   
through premarket FDA   
review. This test is   
used for clinical   
purposes. It should not   
be regarded as   
investigational or for   
research. This   
laboratory is certified   
under the Clinical   
Laboratory Improvement   
Amendments (CLIA) as   
qualified to perform   
high complexity clinical   
laboratory testing. The   
assays were performed   
with appropriate   
positive and negative   
controls which stained   
appropriately.      Normal                                  [facility]  
   
                                        Comment on above:   Order Comment: Pre-o  
p diagnosis:  
Elevated PSA [R97.20]   
   
                                                    MR PROSTATE WITH ZULMA BOUNDAR  
IES IF PIRADS 3 OR ABOVEon 10-   
   
                                                    MR PROSTATE WITH   
ZULMA BOUNDARIES IF   
PIRADS 3 OR ABOVE                       Interpreted By:   
Jesse Mclaughlin and Ravi Shweta  
STUDY:  
MR PROSTATE WITH ZULMA   
BOUNDARIES IF PIRADS 3   
OR ABOVE; 10/23/2024  
5:18 pm  
  
INDICATION:  
Signs/Symptoms:elevated   
PSA. PSA: 8.01   
(2024), 5.51   
(2024)  
  
,R97.20 Elevated   
prostate specific   
antigen (PSA)  
  
COMPARISON:  
None.  
  
ACCESSION NUMBER(S):  
[id number]  
  
ORDERING CLINICIAN:  
XIN SURESH  
  
TECHNIQUE:  
Multiplanar MRI of the   
pelvis was obtained   
including axial,   
sagittal  
and coronal T2 weighted   
SSFSE, axial and   
sagittal T2 FSE, axial   
DWI,  
pre and post gadolinium   
dynamic T1 GRE   
sequences.   
Multiparametric  
analysis was performed.  
  
18 ML of Dotarem was   
administered   
intravenously without   
immediate  
complications.  
  
FINDINGS:  
PROSTATE VOLUME:  
The prostate measures   
4.7 x 3.7 x 5.3 cm in   
right-to-left,  
anterior-posterior and   
craniocaudal dimension.  
  
Prostate weight is   
estimated at 48g. PSA   
density is 0.17 ng/mL/g.  
  
PROSTATE PARENCHYMA:  
There is heterogeneous   
enlargement of the   
transition zone,   
consistent  
with benign prostatic   
hyperplasia.  
  
The peripheral zone is   
diffusely heterogenous   
on T2WI, with bilateral  
polygonal and   
wedge-shaped   
T2-hypointense areas,   
that show no signs  
of abnormally restricted   
diffusion (PI-RADS 2).  
  
Within the apical left   
anterior peripheral zone   
there is a 1.4 cm T2  
hypointense lesion with   
diffusion restriction on   
high B value  
imaging. The lesion   
slightly crosses   
midline.  
  
EXTRACAPSULAR EXTENSION:  
None.  
  
SEMINAL VESICLES:  
Within normal limits.  
  
PELVIC LYMPH NODES:  
No abnormally enlarged   
pelvic lymph nodes are   
identified.  
  
PERITONEUM:  
No free or loculated   
fluid collections are   
evident in the pelvis.  
  
OTHER ORGANS:  
Mild diffuse bladder   
wall thickening and   
trabeculation is   
present,  
likely in the setting of   
chronic outlet   
obstruction. Fat   
containing  
inguinal hernias.  
  
BONES:  
No focal lesions are   
noted in the bone.  
  
Exam Quality:  
Is T2WI weighted imaging   
of diagnostic quality:   
Yes. T2WI  
assessment: Optimal. Is   
DWI of diagnostic   
quality: Yes. DWI  
assessment: Adequate. Is   
DCE of diagnostic   
quality: Yes. DCE  
assessment: Adequate.   
PI-QUAL score: Two or   
more sequences  
independently are of   
diagnostic quality   
Comments:  
  
IMPRESSION:  
1. PI-RADS 4 lesion   
measuring 1.4 cm within   
the left anterior  
peripheral zone of the   
apical gland. No   
evidence of   
extracapsular  
disease.  
2. BPH changes of the   
transition zone. Diffuse   
non nodular  
hypointensities within   
the peripheral zone,   
without evidence of  
focally restricted   
diffusion ( PI-RADS 2).  
  
PI-RADS 4 - High   
(clinically significant   
cancer is likely to be  
present).  
  
I personally reviewed   
the images/study and I   
agree with the findings  
as stated. This study   
was interpreted at   
[place], [place].  
  
MACRO:  
None  
  
Signed by: Jesse Mclaughlin 10/25/2024   
11:16 AM  
Dictation workstation:   
[id number]        Normal                                  [facility]  
   
                                                    Prostate specific Agon    
   
                                                    Prostate specific   
Ag [Mass/Vol]   8.01 ng/mL      High            <=4.00          [facility]  
   
                                        Comment on above:   Order Comment: The F  
DA requires that the method used for PSA   
assay   
be reported to the physician. Values obtained with different assay   
methods must not be used interchangeably. This test was performed   
at [facility] using the Access Hybritech PSA   
assay is a two-site immunoenzymatic sandwich  
assay. The assay is approved for measurement of prostate-specific   
antigen (PSA)in serum and may be used in conjunction with a   
digital rectal examination in men 50 years and older as an aid in   
detection of prostate cancer.  
5-Alpha-reductase inhibitors (e.g. Proscar, Finasteride, Avodart,   
Dutasteride and Loretta) for the treatment of BPH have been shown to   
lower PSA levels by an average of 50% after 6 months of treatment.   
   
                                                            Performed By: #### 2  
857-1 ####  
SOHAN LOYA ([id number])  
[facility] ([facility])  
[address] [place] [address]   
   
                                                    Prostate specific Agon    
   
                                                    Prostate specific   
Ag [Mass/Vol]   5.51 ng/mL      High            <=4.00          [facility]  
   
                                        Comment on above:   Order Comment: The   
DA requires that the method used for PSA   
assay   
be reported to the physician. Values obtained with different assay   
methods must not be used interchangeably. This test was performed   
at [facility] using the Access Hybritech PSA   
assay is a two-site immunoenzymatic sandwich  
assay. The assay is approved for measurement of prostate-specific   
antigen (PSA)in serum and may be used in conjunction with a   
digital rectal examination in men 50 years and older as an aid in   
detection of prostate cancer.  
5-Alpha-reductase inhibitors (e.g. Proscar, Finasteride, Avodart,   
Dutasteride and Loretta) for the treatment of BPH have been shown to   
lower PSA levels by an average of 50% after 6 months of treatment.   
   
                                                            Performed By: #### 2  
857-1 ####  
SOHAN LOYA ([id number])  
[facility] ([facility])  
[address] [place], [place] [id number]   
   
                                                    MRI Prostateon 09-   
   
                      MRI Prostate            Normal                PB-Rddcvvd-V  
ic  
hland    
DO  
Work Phone:   
[phone]  
   
                                                    IO UA (automated w/o microsc  
opy)on 2023   
   
                                                    Protein (U)   
[Mass/Vol]      Trace                                           GM-Ypupnwx-Vdi  
land  
Work Phone:   
[phone]  
   
                                                    IO UA (automated   
w/o microscopy) Negative                                        OO-Bbhreqq-Fdt  
land  
Work Phone:   
[phone]  
   
                                                    IO UA (automated   
w/o microscopy) Normal                                          LT-Ssngger-Aui  
land  
Work Phone:   
[phone]  
   
                                                    IO UA (automated   
w/o microscopy) 5.5 1                                           WH-Vikvmba-Pbi  
land  
Work Phone:   
[phone]  
   
                                                    IO UA (automated   
w/o microscopy) Trace                                           TM-Pfmsxck-Lzu  
land  
Work Phone:   
[phone]  
   
                                                    IO UA (automated   
w/o microscopy) 1.020 1                                         XQ-Cuwspgf-Byr  
land  
Work Phone:   
[phone]  
   
                                                    IO UA (automated   
w/o microscopy) (+)small - 15                                   BB-Czaxecn-Lff  
land  
Work Phone:   
[phone]  
   
                                                    IO UA (automated   
w/o microscopy) 1000 mg/dl                                      AH-Qtohwnt-Hss  
land  
Work Phone:   
[phone]  
   
                                                    IO UA (automated   
w/o microscopy) Clear                                           FV-Eujtuhm-Rye  
land  
Work Phone:   
[phone]  
   
                                                    IO UA (automated   
w/o microscopy) Yellow                                          VF-Tzxpalm-KduFjuul  
Work Phone:   
[phone]  
   
                                                    Office Visit (Urology)on    
   
                                        Follow-up visit     Diagnoses/Problems  
Assessed  
Benign prostatic   
hyperplasia with urinary   
obstruction and other   
lower urinary tract  
symptoms (600.21)   
(N40.1,N13.8)  
Elevated PSA (790.93)   
(R97.20)  
Nocturia (788.43)   
(R35.1)  
Never a smoker  
Patient   
Discussion/Summary  
All available PSA values   
reviewed, Options   
discussed. Questions   
answered.  
Pros and cons of   
prostate biopsy   
reviewed. Other options   
discussed. Questions   
answered  
MRI ordered  
Diet changes for   
prostate health   
discussed and   
educational information   
given. Pros/Cons of   
prostate health   
supplements discussed.  
Treatment options for   
LUTS reviewed  
Discussed timed voiding.   
Discussed fluid and   
caffeine intake  
Treatment options for ED   
reviewed.  
DIscussed TriMix   
discussed  
Valium Rx given for MRI  
F/U after MRI  
  
Chief Complaint  
ELEVATED PSA  
  
History of Present   
IllnessPatient is here   
for elevated PSA. Most   
recent PSA was 6.70 on   
. Prior PSA was 2.87   
on 2020. Prior PSA was   
2.35 in 2019. Prior PSA   
was 2.14 in 2018. No fhx   
of prostate cancer.   
Never had a bx. Chronic   
BPH sx are mild and   
stable. Some urgency and   
frequency. Denies   
dysuria. Denies   
hematuria. Nocturia   
x2-3.. Patient states he   
is also having trouble   
ejaculating. ED is mild  
  
Review of Systems  
Constitutional: No   
fever, No chills.  
Eye: Negative.  
Ear/Nose/Mouth/Throat:   
Negative.  
Respiratory: No   
shortness of breath, No   
cough.  
Cardiovascular: No chest   
pain, No peripheral   
edema.  
Gastrointestinal: No   
nausea,  
Genitourinary: Negative   
except as documented in   
history of present   
illness.  
Hematology/Lymphatics:   
Patient denies being on   
blood thinners..  
Endocrine: Negative.  
Immunologic: Not   
immunocompromised.  
Musculoskeletal:   
Negative  
Integumentary: Negative.  
Neurologic: Alert and   
oriented X4.  
Psychiatric: Negative.  
  
Surgical History  
Problems  
History of Hernia repair  
Family History  
Mother  
No pertinent family   
history  
Father  
No pertinent family   
history  
Social History  
Problems  
Never a smoker  
Allergies  
Medication  
No Known Drug Allergies  
Recorded By: Christina Martin; 2023   
2:00:24 PM  
Current Meds  
  
Medication   
NameInstruction  
hydroCHLOROthiazide 25   
MG Oral Tablet  
Pantoprazole Sodium 40   
MG Oral Tablet Delayed   
Release  
Vitals  
Vital Signs  
Recorded: [id number]   
01:59PM  
Rugfeiqbxpg32  
Height5 ft 8 in  
Xssyuo745 lb  
BMI Feubevooxg78.52   
kg/m2  
BSA Calculated1.96  
Tobacco Useb) No  
PHQ-2 Patient   
Declined/Screening not   
indicatedYes  
Falls Screening (Age   
18+)a) No falls within   
the last year  
Physical Exam  
A/O x 3 in No apparent   
distress  
Constitutional: General   
appearance normal  
Respiratory: Respiratory   
effort is normal  
Gastrointestinal:Abdomen   
is not tender  
Genitourinary:  
Kidneys: Not palpable   
Bilaterally  
Bladder: Not palpable or   
tender  
Scrotum: No mass. No   
Hydrocele  
Epididymis: No   
spermatocele. Not tender  
Testicles: no mass. WNL  
Urethra: No Discharge  
Penis: WNL...No lesions.   
circumcised  
Prostate: Symmetric. No   
Nodules. BENIGN  
Seminal Vesicles: No   
mass  
Sphincter Tone: normal  
  
Signatures  
Electronically signed by   
: Xin Suresh II, MD; Aug   
30 2023 2:10PM EST   
(Author)            Normal                                   [facility]  
   
                                                    Tobacco Screening.on   
023   
   
                                                    Fall risk   
assessment                              a) No falls within the   
last year                                                   IS[facility]Hgqlozq-Pxo  
land  
Work Phone:   
[phone]  
   
                                                    Tobacco use status   
CP            b) No                                           VZ[facility]Nemchnd-Rbu  
land  
Work Phone:   
[phone]  
   
                      Tobacco Screening. Yes                              MP-Uro  
logy-Jonny  
land  
Work Phone:   
[phone]  
  
  
  
Vital Signs  
  
  
                          Date Time    Vital Sign   Value        Performing   
Clinician                               Facility  
   
                                                    2025   
14:                              Diastolic blood   
pressure                  93 mm[Hg]                 Xin Suresh MD  
Work Phone:   
[phone]                          [facility]  
   
                                                    2025   
14:          Heart rate          84 /min             Xin Suresh MD  
Work Phone:   
[phone]                          [facility]  
   
                                                    2025   
14:          Respiratory rate    16 /min             Xin Suresh MD  
Work Phone:   
[phone]                          [facility]  
   
                                                    2025   
14:                              SaO2% (BldA) [Mass   
fraction]                 100 %                     Xin Suresh MD  
Work Phone:   
[phone]                          [facility]  
   
                                                    2025   
14:                              Systolic blood   
pressure                  126 mm[Hg]                Xin Suresh MD  
Work Phone:   
[phone]                          [facility]  
   
                                                    2025   
14:          Body temperature    96.91 [degF]        Xin Suresh MD  
Work Phone:   
[phone]                          [facility]  
   
                                                    2025   
12:                              Body mass index   
(BMI) [Ratio]             29.65 kg/m2               Xin Suresh MD  
Work Phone:   
[phone]                          [facility]  
   
                                                    2025   
12:          Body weight         88.45 kg            Xin Suresh MD  
Work Phone:   
[phone]                          [facility]  
   
                                                    2024   
10:                              Body mass index   
(BMI) [Ratio]             29.65 kg/m2               Xin Suresh MD  
Work Phone:   
[phone]                          [facility]  
   
                                                    2024   
10:          Body weight         88.45 kg            Xin Suresh MD  
Work Phone:   
[phone]                          [facility]  
   
                                                    2024   
10:                              Diastolic blood   
pressure                  58 mm[Hg]                 Xin Suresh MD  
Work Phone:   
[phone]                          [facility]  
   
                                                    2024   
10:          Heart rate          93 /min             Xin Suresh MD  
Work Phone:   
[phone]                          [facility]  
   
                                                    2024   
10:          Respiratory rate    16 /min             Xin Suresh MD  
Work Phone:   
[phone]                          [facility]  
   
                                                    2024   
10:                              Systolic blood   
pressure                  85 mm[Hg]                 Xin Suresh MD  
Work Phone:   
[phone]                          [facility]  
   
                                                    2024   
09:                              Diastolic blood   
pressure                  86 mm[Hg]                 Xin Suresh MD  
Work Phone:   
[phone]                          [facility]  
   
                                                    2024   
09:          Heart rate          78 /min             Xin Suresh MD  
Work Phone:   
[phone]                          [facility]  
   
                                                    2024   
09:          Respiratory rate    18 /min             Xin Suresh MD  
Work Phone:   
[phone]                          [facility]  
   
                                                    2024   
09:                              SaO2% (BldA) [Mass   
fraction]                 98 %                      Xin Suresh MD  
Work Phone:   
[phone]                          [facility]  
   
                                                    2024   
09:                              Systolic blood   
pressure                  126 mm[Hg]                Xin Suresh MD  
Work Phone:   
[phone]                          [facility]  
   
                                                    2024   
08:          Body temperature    97.5 [degF]         Xin Suresh MD  
Work Phone:   
[phone]                          [facility]  
   
                                                    2024   
06:          Body height         172.7 cm            Xin Suresh MD  
Work Phone:   
[phone]                          [facility]  
   
                                                    2024   
06:                              Body mass index   
(BMI) [Ratio]             28.56 kg/m2               Xin Suresh MD  
Work Phone:   
[phone]                          [facility]  
   
                                                    2024   
06:          Body weight         85.2 kg             Xin Suresh MD  
Work Phone:   
[phone] [place] [address]  
   
                                                    10-   
07:                              Body mass index   
(BMI) [Ratio]             29.04 kg/m2               Xin Suresh MD  
Work Phone:   
[phone] [place] [address]  
   
                                                    10-   
07:          Body weight         86.64 kg            Xin Suresh MD  
Work Phone:   
[phone]                          [facility]  
   
                                                    10-   
07:                              Diastolic blood   
pressure                  84 mm[Hg]                 Xin Suresh MD  
Work Phone:   
[phone]                          [facility]  
   
                                                    10-   
07:                              Systolic blood   
pressure                  132 mm[Hg]                Xin Suresh MD  
Work Phone:   
[phone]                          [facility]  
   
                                                    2024   
08:                              Body mass index   
(BMI) [Ratio]             28.89 kg/m2               Xin Suresh MD  
Work Phone:   
[phone]                          [facility]  
   
                                                    2024   
08:          Body weight         86.18 kg            Xin Suresh MD  
Work Phone:   
[phone]                          [facility]  
   
                                                    2024   
08:          Respiratory rate    16 /min             Xin Suresh MD  
Work Phone:   
[phone]                          [facility]  
   
                                                    2023   
13:          Body height         172.72 cm           Antonios Aaron  
Work Phone:   
[phone]                          [facility]  
Work Phone:   
[phone]   
13:                              Body mass index   
(BMI) [Ratio]             27.52 kg/m2               Antonios Aaron  
Work Phone:   
[phone]                          [facility]Ylchzrp[facility]Ihbjppk  
Work Phone:   
[phone]   
13:                              Body surface area   
Derived from formula      1.96 m2                   Antonios Aaron  
Work Phone:   
[phone]                          [facility]Hxjpesn[facility]Vkogxis  
Work Phone:   
[phone]   
13:          Body weight         82.1 kg             Antonios Aaron  
Work Phone:   
[phone]                          [facility]Dpnabfc[facility]Chcvgrc  
Work Phone:   
[phone]   
13:          Respiratory rate    16 /min             Antonios Aaron  
Work Phone:   
[phone]                          [facility]Yc[facility]uum[facility]Dkqti[facility]  
Work Phone:   
[phone]  
   
                                                    2022   
18:                              Diastolic blood   
pressure                  88 mm[Hg]                 Antonios Aaron  
Other Phone:   
[phone]                          [facility]  
   
                                                    2022   
18:          Heart rate          101 /min            Antonios Aaron  
Other Phone:   
[phone]                          [facility]  
   
                                                    2022   
18:          Respiratory rate    16 /min             Antonios Aaron  
Other Phone:   
[phone]                          [facility]  
   
                                                    2022   
18:                              SaO2% (BldA) [Mass   
fraction]                 97 %                      Antonios Aaron  
Other Phone:   
[phone]                          [facility]  
   
                                                    2022   
18:                              Systolic blood   
pressure                  113 mm[Hg]                Antonios Aaron  
Other Phone:   
[phone]                          [facility]  
   
                                                    2022   
16:          Body height         172.7 cm            Antonios Aaron  
Other Phone:   
[phone]                          [facility]  
   
                                                    2022   
16:          Body temperature    98.06 [degF]        Antonios Aaron  
Other Phone:   
[phone]                          [facility]  
   
                                                    2022   
16:          Body weight         69 kg               Antonios Aaron  
Other Phone:   
[phone]                          [facility]  
  
  
  
Encounters  
  
  
                          Encounter Date Encounter Type Care Provider Facility  
   
                                                    Start: 2025  
End: 2025                         Subsequent hospital   
visit by physician                      Xin Suresh MD  
Work Phone:   
[phone]                          [facility] OR  
   
                                        Comment on above:   Prostate cancer ([place]  
ti) (Primary Dx)   
   
                          Start: 2025 ambulatory   [facility]  
   
                                                    Start: 2024  
End: 2024                         Office outpatient visit   
25 minutes                              Xin Suresh MD  
Work Phone:   
[phone]                          [facility]  
   
                                        Comment on above:   Nocturia;  
Elevated PSA;  
Erectile dysfunction, unspecified erectile dysfunction type;  
Malignant neoplasm of prostate (Multi)   
   
                                                    Start: 2024  
End: 2024     ambulatory          [facility]  
   
Ambulatory  
   
                                                    Start: 2024  
End: 2024                         Subsequent hospital   
visit by physician                      Xin Suresh MD  
Work Phone:   
[phone]                          [facility] OR  
   
                                        Comment on above:   Elevated PSA (Primar  
y Dx)   
   
                          Start: 2024 ambulatory   [facility]  
   
                                                    Start: 10-  
End: 10-                         Office outpatient visit   
25 minutes                              Xin Suresh MD  
Work Phone:   
[phone]                          [facility]  
   
                                        Comment on above:   Erectile dysfunction  
, unspecified erectile dysfunction type;  
Elevated PSA;  
Benign prostatic hyperplasia with urinary obstruction;  
Nocturia   
   
                                                    Start: 10-  
End: 10-     ambulatory          [facility]  
   
Ambulatory  
   
                                                    Start: 10-  
End: 10-                         Subsequent hospital   
visit by physician        [facility]  
   
                                        Comment on above:   Elevated PSA   
   
                                                    Start: 10-  
End: 10-     ambulatory          [facility]  
   
                                                    Start: 10-  
End: 10-                         Office outpatient visit   
25 minutes                              Xin Suresh MD  
Work Phone:   
[phone]                          [facility]  
   
                                        Comment on above:   Elevated PSA;  
Benign prostatic hyperplasia with urinary obstruction;  
Erectile dysfunction, unspecified erectile dysfunction type;  
Nocturia   
   
                                                    Start: 10-  
End: 10-     ambulatory          [facility]  
   
Ambulatory  
   
                                                    Start: 2024  
End: 2024     ambulatory          [facility]  
   
                                                    Start: 2024  
End: 2024     ambulatory          SELF                Facility:[facility]  
   
                                                    Start: 2024  
End: 2024                         Patient encounter   
procedure                               Kary Lamb OD  
Work Phone:   
[phone]                          Optometry  
   
                                        Comment on above:   Type 2 diabetes elle  
itus without retinopathy (HCC) (Primary   
Dx);  
Combined form of senile cataract of both eyes;  
Floaters, right;  
Asteroid hyalosis of left eye;  
Hypermetropia, bilateral;  
Regular astigmatism of both eyes;  
Presbyopia   
   
                                                    Start: 2024  
End: 2024                         Office outpatient visit   
25 minutes                              Xin Suresh MD  
Work Phone:   
[phone]                          [facility]  
   
                                        Comment on above:   Benign prostatic hyp  
erplasia with urinary obstruction;  
Elevated PSA;  
Erectile dysfunction, unspecified erectile dysfunction type;  
Nocturia   
   
                                                    Start: 2024  
End: 2024     ambulatory          XIN SURESH         [facility]  
   
Ambulatory  
   
                                                    Start: 2024  
End: 2024     ambulatory          CECILIA WALKER      [facility]  
   
                          Start: 09- ambulatory   Dr. Cecilia Walker Faci  
lity:[id number]  
   
                                        Start: 2023   Office outpatient ne  
w   
45 minutes                              Cecilia Walker  
Work Phone:   
[phone]                          [facility]  
Work Phone:   
[phone]  
   
                          Start: 2023 ambulatory   MD XIN SURESH Fa  
cility:[id number]  
   
                                                    Start: 10-  
End: 10-                         Patient encounter   
procedure                               Shantanu Lamb OD  
Work Phone:   
[phone]                          Optometry  
   
                                        Comment on above:   Type 2 diabetes elle  
itus without retinopathy (HCC) (Primary   
Dx);  
Asteroid hyalosis of left eye;  
Hypermetropia, bilateral;  
Regular astigmatism of both eyes;  
Presbyopia   
   
                                                    Start: 2022  
End: 2022                         Emergency department   
patient visit             Byron Madsen                [facility]  
  
  
  
Procedures  
  
  
                          Date         Procedure    Procedure Detail Performing   
Clinician  
   
                                        Start: 2025   Glucose quantitative  
 blood   
xcpt reagent strip                                  Xin Suresh MD  
Work Phone:   
[phone]  
   
                                        Start: 2024   Glucose quantitative  
 blood   
xcpt reagent strip                                  Xin Suresh MD  
Work Phone:   
[phone]  
   
                          Start: 2024 PULSE OXIMETRY, SPOT              Alina Suresh MD  
Work Phone:   
[phone]  
   
                          Start: 2024 PROSTATE SPECIFIC ANTIGEN             
   CECILIA WALKER  
   
                                                    Start: 2022  
End: 2022     EKG impression                          Byron Madsen  
   
                          Start: 2022 Colonoscopy               Kary Jeanne caballero OD  
Work Phone:   
[phone]  
   
                          Start: 2017 Colonoscopy               Shantanu alvarez II, OD  
Work Phone:   
[phone]  
   
                                       Hernia repair              Cecilia Walker  
Work Phone:   
[phone]  
  
  
  
Plan of Treatment  
  
  
                          Date         Care Activity Detail       Author  
   
                                        Start: 2036   RSV High Risk: (Elde  
rly   
(60+) or Pregnant   
Population) (1 - 1-dose   
75+ series)                             RSV High Risk: (Elderly   
(60+) or Pregnant   
Population) (1 - 1-dose   
75+ series)                             [facility]  
   
                                        Start: 2029   Prostate specific   
antigen measurement                     Prostate Cancer Screening   
Discussion                              [facility]  
   
                                        Start: 2027   Diabetes mellitus   
screening                 Diabetes Screening        [facility]  
   
                                        Start: 2027   Screening for malign  
ant   
neoplasm of colon                                   [facility]  
   
                                        Start: 2025   Diabetes mellitus   
screening                 Diabetes Screening        [facility]  
   
                                                    Start: 2025  
End: 2025                         Patient encounter   
procedure                               2025 8:30 AM EDT   
Office Visit [facility] [address]   
[facility] [address]   
[place] [id number] [phone] Xin Suresh MD [address] [place]   
[place] [address]   
[phone] (Work)   
[phone] (Fax)                      [facility]  
   
                          Start: 2025 Glaucoma screening Dilated Retinal E  
xam [facility]  
   
                                                    Start: 2025  
End: 2025                         Plmt interstitial dev   
radiat tx prostate   
1/mult                                  Insertion Fiducial Marker   
Prostate Prostate cancer   
(Multi) 2025 1:09   
PM EST                                  [facility]  
   
                                                    Start: 2024  
End: 2024                         Prostate needle biopsy   
any approach                            Biopsy Prostate with   
Navigation Elevated PSA   
2024 7:34 AM EST                  [facility]  
   
                                                    Start: 2024  
End: 2024                         Us guidance needle   
placement img s&i                       Creation Mario Hole with   
Insertion Electrography   
Lead and Navigation   
Elevated PSA 2024   
7:34 AM EST                             [facility]  
   
                                                    Start: 2024  
End: 2024           Us transrectal            Ultrasonography   
Transrectal Prostate   
Elevated PSA 2024   
7:34 AM EST                             [facility]  
   
                                                    Start: 10-  
End: 10-           MR Prostate               MR prostate with zulma   
boundaries if pirads 3 or   
above Imaging Routine   
Elevated PSA Expected:   
10/09/2024 (Approximate),   
Expires: 10/09/2025                     [facility]  
Work Phone:   
[phone]  
   
                                        Comment on above:   Expected: 10/09/2024  
 (Approximate), Expires: 10/09/2025   
   
                                        Start: 2024   COVID-19 Vaccine ( season)                         COVID-19 Vaccine ( season)                         [facility]  
   
                          Start: 2024 Influenza vaccination Influenza Vacc  
ine (#1) [facility]  
   
                                                    Start: 2024  
End: 2025                         Prostate specific Ag   
[Mass/volume] in Serum   
or Plasma                               Prostate Specific Antigen   
Lab Routine Elevated PSA   
Expected: 2024   
(Approximate), Expires:   
2025                              [facility]  
Work Phone:   
[phone]  
   
                                        Comment on above:   Expected: 2024  
 (Approximate), Expires: 2025   
   
                          Start: 2024 Depression Assessment [facility]  
[facility]  
   
                                        Start: 10-   Hepatitis C antibody  
,   
confirmatory test         DILATED RETINAL EXAM      [facility]  
   
                                        Start: 2023   FUV, Provider: Xin Suresh II, Status: Pen,   
Time: 1:45 PM                           FUV, Provider: Xin Suresh II, Status: Pen,   
Time: 1:45 PM                           [facility]  
Work Phone:   
[phone]  
   
                          Start: 2023 Influenza vaccination Influenza Vacc  
ine (#1) [facility]  
   
                                        Start: 2023   Screening for malign  
ant   
neoplasm of colon                                   [facility]  
   
                          Start: 2022 Influenza vaccination INFLUENZA (#1)  
 [facility]  
   
                          Start: 2022 DEPRESSION ASSESSMENT [facility]  
[facility]  
   
                                        Start: 2021   RSV Pregnant patient  
s   
and/or patients aged   
60+ years (1 - 1-dose   
60+ series)                             RSV Pregnant patients   
and/or patients aged 60+   
years (1 - 1-dose 60+   
series)                                 [facility]  
   
                                        Start: 2021   RSV Vaccine (1 - 1-d  
ose   
60+ series)                             RSV Vaccine (1 - 1-dose   
60+ series)                             [facility]  
   
                          Start: 2018 Colonoscopy  COLONOSCOPY  [facility]  
   
                                        Start: 2018   COLORECTAL CANCER   
SCREENING                               COLORECTAL CANCER   
SCREENING                               [facility]  
   
                                        Start: 2016   PROSTATE CANCER   
SCREENING DISCUSSION                    PROSTATE CANCER SCREENING   
DISCUSSION                              [facility]  
   
                                        Start: 2011   SHINGRIX VACCINE (1   
of   
2)                        SHINGRIX VACCINE (1 of 2) [facility]  
   
                                        Start: 2011   Zoster Vaccines (1 o  
f   
2)                        Zoster Vaccines (1 of 2)  [facility]  
   
                          Start: 2006 COLOGUARD (FIT-DNA) COLOGUARD (FIT-D  
NA) [facility]  
   
                          Start: 2006 CT COLONOGRAPHY CT COLONOGRAPHY [facility]  
   
                          Start: 2006 FECAL OCCULT BLOOD FECAL OCCULT BLOO  
D [facility]  
   
                                        Start: 2006   Screening for malign  
ant   
neoplasm of colon                                   [facility]  
   
                          Start: 2006 SIGMOIDOSCOPY SIGMOIDOSCOPY [facility]  
   
                                        Start: 1983   DTaP/Tdap/Td Vaccine  
s   
(1 - Tdap)                              DTaP/Tdap/Td Vaccines (1   
- Tdap)                                 [facility]  
   
                                        Start: 1980   Pneumococcal   
vaccination                             Pneumococcal Vaccine (1   
of 2 - PCV)                             [facility]  
   
                                        Start: 1980   Urine microalbumin   
profile                                             [facility]  
   
                                        Start: 1980   Urine screening for   
protein                                 Diabetes: Urine Protein   
Screening                               [facility]  
   
                                        Start: 1979   ANNUAL PCP TEAM   
LORENZA   
DISEASE VISIT                           ANNUAL PCP TEAM CHRONIC   
DISEASE VISIT                           [facility]  
   
                          Start: 1979 BP CONTROLLED (<130/80) BP CONTROLLE  
D (<130/80) [facility]  
   
                                        Start: 1979   Hepatitis B surface   
antibody level            LDL CHOLESTEROL           [facility]  
   
                          Start: 1979 HEPATITIS C SCREENING HEPATITIS C [facility]  
   
                          Start: 1979 Hepatitis C screening Hepatitis C [facility]  
   
                          Start: 1979 HIV SCREENING HIV SCREENING [facility]  
   
                          Start: 1979 HIV screening HIV Screening [facility]  
   
                                        Start: 1971   3 comp foot exam   
completed                 DIABETIC FOOT EXAM        [facility]  
   
                                        Start: 1971   Diabetic foot   
examination               Diabetic Foot Exam        [facility]  
   
                                Start: 1971 Glaucoma screening Diabetes: R  
etinopathy   
Screening                               [facility]  
   
                                Start: 1971 Hepatitis B screening URINE AL  
BUMIN:CREATININE   
RATIO                                   [facility]  
   
                          Start: 1967 PNEUMOCOCCAL (1 - PCV) PNEUMOCOCCAL   
(1 - PCV) [facility]  
   
                                        Start: 1967   Pneumococcal   
vaccination                             Pneumococcal Vaccine (1   
of 2 - PCV)                             [facility]  
   
                                        Start: 1967   Pneumococcal Vaccine  
:   
Pediatrics (0 to 5   
Years) and At-Risk   
Patients (6 to 64   
Years) (1 of 2 - PCV)                   Pneumococcal Vaccine:   
Pediatrics (0 to 5 Years)   
and At-Risk Patients (6   
to 64 Years) (1 of 2 -   
PCV)                                    [facility]  
   
                                        Start: 1966   Hemoglobin A1c   
measurement               HbA1C                     [facility]  
   
                                        Start: 1966   Hemoglobin   
A1c/Hemoglobin.total in   
Blood                     HBA1C                     [facility]  
   
                                        Start: 1962   MMR Vaccines (1 of 1  
 -   
Standard series)                        MMR Vaccines (1 of 1 -   
Standard series)                        [facility]  
   
                          Start: 1962 COVID-19 VACCINE (#1) COVID-19 VACCI  
NE (#1) [facility]  
   
                                        Start: 1961   Hemoglobin A1c   
measurement               Diabetes: Hemoglobin A1C  [facility]  
   
                          Start: 1961 HIV screening HIV Screening [facility]  
   
                          Start: 1961 Lipid panel  Lipid Panel  [facility]  
   
                                        Start: 1961   Screening for malign  
ant   
neoplasm of colon                                   [facility]  
   
                                        Start: 1961   Urine screening for   
protein                                 Diabetes: Urine Protein   
Screening                               [facility]  
   
                          Start: 1961 Yearly Adult Physical Yearly Adult P  
hysical [facility]  
   
                                                      
End: 2025                         Blood type and Indirect   
antibody screen panel -   
Blood                                   Type And Screen Lab Timed   
As needed (Lab) until   
discontinued starting   
2025                              [facility]  
Work Phone:   
[phone]  
   
                                        Comment on above:   As needed (Lab) unti  
l discontinued starting 2025   
   
                                                      
End: 2024                         Continuous Pulse   
oximetry, In Phase 1                    Continuous Pulse   
oximetry, In Phase 1   
Respiratory Care Routine   
Continuous until   
discontinued starting   
2024                              [facility]  
Work Phone:   
[phone]  
   
                                        Comment on above:   Continuous until dis  
continued starting 2024   
   
                                                      
End: 2025                         Continuous Pulse   
oximetry, In Phase 1                    Continuous Pulse   
oximetry, In Phase 1   
Respiratory Care Routine   
Continuous until   
discontinued starting   
2025                              [facility]  
Work Phone:   
[phone]  
   
                                        Comment on above:   Continuous until dis  
continued starting 2025   
   
                                                      
End: 2024                         Glucose [Mass/volume]   
in Serum or Plasma                      POCT Glucose Point of   
Care Testing - Docked   
Device Routine Once (Lab)   
for 1 Occurrences   
starting 2024 until   
2024                              [facility]  
Work Phone:   
[phone]  
   
                                        Comment on above:   Once (Lab) for 1 Occ  
urrences starting 2024 until   
2024   
   
                                                      
End: 2025                         Glucose [Mass/volume]   
in Serum or Plasma                      POCT Glucose Point of   
Care Testing - Docked   
Device Routine Once (Lab)   
for 1 Occurrences   
starting 2025 until   
2025                              [facility]  
Work Phone:   
[phone]  
   
                                        Comment on above:   Once (Lab) for 1 Occ  
urrences starting 2025 until   
2025   
   
                                                      
End: 10-     MR Prostate                             [facility]  
Work Phone:   
[phone]  
   
                                        Comment on above:   Once for 1 Occurrenc  
es starting 10/23/2024 until 10/23/2024   
   
                                                      
End: 2025           Pulse oximetry, spot      Pulse oximetry, spot   
Respiratory Care Routine   
Once for 1 Occurrences   
starting 2025 until   
2025                              [facility]  
Work Phone:   
[phone]  
   
                                        Comment on above:   Once for 1 Occurrenc  
es starting 2025 until 2025   
   
                                                            Surgical pathology   
study                                   Surgical Pathology Exam   
Pathology and Cytology   
Timed Elevated PSA   
Release Upon Ordering for   
1 Occurrences starting   
2024                              [facility]  
Work Phone:   
[phone]  
   
                                        Comment on above:   Release Upon Orderin  
g for 1 Occurrences starting 2024   
  
  
  
Immunizations  
  
  
                      Immunization Date Immunization Notes      Care Provider Fa  
milli  
   
                                        2023          influenza virus   
vaccine, unspecified   
formulation                                         Xin Suresh MD  
Work Phone:   
[phone]                          [facility]  
Work Phone:   
[phone]  
   
                                        10-          influenza, injectabl  
e,   
quadrivalent,   
preservative free                                   Xin Suresh MD  
Work Phone:   
[phone]                          [facility]  
Work Phone:   
[phone]  
   
                                        10-          Moderna COVID-19   
vaccine, bivalent,   
blue cap/gray label   
*Check age/dose*                                    Xin Suresh MD  
Work Phone:   
[phone]                          [facility]  
Work Phone:   
[phone]  
   
                                        10-          influenza virus   
vaccine, unspecified   
formulation                                         Kary Lamb   
OD  
Work Phone:   
[phone]                          [facility]  
  
  
  
Payers  
  
  
                          Date         Payer Category Payer        Policy ID  
   
                          2021   Managed Care (Private)              1.2.8  
[id number]  
[id number]  
   
                          2021   Private Health Insurance              1.2  
[id number]  
   
                          2021   Private Health Insurance              [id number]  
[id number]  
   
                          1961   Unknown                   [id number] 2.16.  
[id number]  
   
                          1961   Unknown                   [id number] 2.16.8  
[id number]  
   
                          1961   Unknown                   [id number] 2.16.8  
[id number]  
   
                          1961   Unknown                   [id number] 2.16.8  
[id number]  
   
                          1961   Unknown                   [id number] 2.16.  
[id number]  
   
                          1961   Unknown                   [id number] 2.16.  
[id number]  
   
                          1961   Unknown                   [id number] 2.16.  
[id number]  
   
                          1961   Unknown                   [id number] 2.16.8  
[id number]  
   
                          1961   Unknown                   [id number] 2.16.8  
[id number]  
   
                          1961   Unknown                   [id number] 2.16.8  
[id number]  
   
                          1961   Unknown                   [id number] 2.16.8  
[id number]  
   
                                       Unknown                     
  
  
  
Social History  
  
  
                          Date         Type         Detail       Facility  
   
                                                                 [facility]  
   
                                                                Tobacco smoking   
consumption unknown                     [facility]  
   
                                                    Start: 10-  
End: 10-                         Tobacco smoking status   
NHIS                      Never smoked tobacco      [facility]  
   
                                        Start: 10-   Tobacco use and   
exposure                                Former smokeless tobacco   
user                                    [facility]  
   
                                                    Start: 10-  
End: 2024           Alcohol intake            Current non-drinker of   
alcohol (finding)                       [facility]  
   
                                Start: 10- Tobacco Comment chew tobacco f  
rom age 16   
to 36                                   [facility]  
   
                                Start: 2016 Alcohol Comment Quit 7.5 month  
s ago in   
AA                                      [facility]  
   
                          Start: 1960 Sex Assigned At Birth Not on file  C  
[facility]  
   
                                                    Start: 10-  
End: 2025                         Exposure to SARS-CoV-2   
(event)                   Not sure                  [facility]  
   
                                                    Start: 10-  
End: 2025     Never a smoker      Never a smoker      [facility]  
Work Phone:   
[phone]  
   
                                        Start: 10-   Tobacco use and   
exposure                                Smokeless tobacco   
non-user                                [facility]  
Work Phone:   
[phone]  
   
                                                    Start: 10-  
End: 2025     Tobacco use panel                       [facility]  
Work Phone:   
[phone]  
   
                                                    Start: 2024  
End: 2025                         Alcoholic beverage   
intake                    Ex-drinker (finding)      [facility]  
Work Phone:   
[phone]  
   
                          Start: 2024 Alcohol Comment sober for 9 years Un  
[facility]  
Work Phone:   
[phone]  
  
  
  
Medical Equipment  
  
  
                                Procedure Code  Equipment Code  Equipment   
Original Text                           Equipment   
Identifier                              Dates  
   
                                                                One-Of-A-Kind   
Implant -   
[id number]                [id number]               Start:   
2016  
   
                                        Comment on above:   Description: bard so  
ft mesh size 3x6cm   
   
                                                                One-Of-A-Kind   
Implant -   
[id number]                [id number]               Start:   
2016  
   
                                        Comment on above:   Description: perfix   
light plug extralarge   
   
                                                                Gold Markers,   
1.2mm, Soft   
Tissue, 20cm 17ga   
Needles, 3-Pk,   
Strl - Sn/A -   
[id number]                              [id number]  
[id number]  
/A, [id number] FDA                      Start:   
2025  
  
  
  
Clinical Notes 2016 to 2025  
 Op Note - Xin Suresh MD - 2025 1:19 PM ESTOp Note - Xin Suresh MD -   
2025 1:19 PM ESTPreprocedure Instructions - Venus Najera RN - 2025 
12:26 PM ESTPatient Instructions  
  
                                Note Date & Type Note            Facility  
   
                                        2025 Note     Formatting of this n  
ote is   
different from the original.  
Insertion Fiducial Marker   
Prostate Operative Note  
  
Date: 2025 OR Location: [facility]   
OR  
  
Name: Xin Wesley, :   
1960, Age: 64 y.o., MRN:   
[id number], Sex: male  
  
Diagnosis  
Pre-op Diagnosis  
* Prostate cancer (Multi) [C61]   
Post-op Diagnosis  
* Prostate cancer (Multi) [C61]  
  
Procedures  
Insertion Fiducial Marker   
Prostate  
58925 - [place] PLMT INTERSTITIAL DEV   
RADIAT TX PROSTATE 1/MULT  
  
Surgeons  
* Xin Suresh - Primary  
  
Resident/Fellow/Other Assistant:  
Surgeons and Role:  
* No surgeons found with a   
matching role *  
  
Staff:  
Ousmaneulator: Asmita Herndon Person: Skyla  
  
Anesthesia Staff:   
Anesthesiologist: Lionel Pierce MD  
  
Procedure Summary  
Anesthesia: Anesthesia type not   
filed in the log. ASA: II  
Estimated Blood Loss: 0mL  
Intra-op Medications:   
Administrations occurring from   
1230 to 1300 on 25:  
* No intraprocedure medications   
in log *  
  
  
Anesthesia Record  
  
  
  
  
Intraprocedure I/O Totals  
  
None  
  
  
  
Specimen: No specimens collected  
  
  
Drains and/or Catheters: * None   
in log *  
  
Tourniquet Times:  
  
  
Implants:  
Implants  
  
Type Name Action Serial No.  
Implant GOLD MARKERS, 1.2MM,   
SOFT TISSUE, 20CM 17GA NEEDLES,   
3-PK, STRL - SN/A - [id number]   
Implanted N/A  
  
  
  
  
Indications: Xin Wesley is an   
64 y.o. male who is having   
surgery for Prostate cancer   
(Multi) [C61].  
  
The patient was seen in the   
preoperative area. The risks,   
benefits, complications,   
treatment options, non-operative   
alternatives, expected recovery   
and outcomes were discussed with   
the patient. The possibilities   
of reaction to medication,   
pulmonary aspiration, injury to   
surrounding structures,   
bleeding, recurrent infection,   
the need for additional   
procedures, failure to diagnose   
a condition, and creating a   
complication requiring   
transfusion or operation were   
discussed with the patient. The   
patient concurred with the   
proposed plan, giving informed   
consent. The site of surgery was   
properly noted/marked if   
necessary per policy. The   
patient has been actively warmed   
in preoperative area.  
Anesthetic: General  
  
Estimated blood loss: Minimal.  
  
Complications: None.  
  
Pre-Op Diagnosis: Prostate   
cancer.  
  
Post-Op Diagnosis: Prostate   
cancer.  
  
Operation: ULTRASOUND GUIDED   
TRANSPERNINEAL PLACEMENT OF   
SPACEOAR AND FIDUCIAL MARKERS  
  
Physician: Kerwin  
  
Anesthesia: General  
  
INDICATIONS AND CONSENT:  
patient has a history of   
prostate cancer who now presents   
for treatment. After the risks,   
benefits, alternatives and   
indications for the procedure   
were explained, he consented.  
  
PROCEDURE:  
The subject was positioned in   
the dorsal lithotomy position. A   
bilateral pudendal nerve block   
was performed using standard   
technique (1% lidocaine   
solution) The needle was   
advanced to the mid perineum   
region and an adequate dose of   
Lidocaine was injected. Once   
this area became anesthetized   
(~5 min), the needle was   
advanced until it was proximal   
to the pudendal nerve, and an   
additional dose of Lidocaine was   
injected. Once the area was   
completely anesthetized (~5   
min), placement of SpaceOAR   
commence.  
Intra-Service  
Prior to needle insertion, an   
axial measurement of the space   
between the prostate (mid gland)   
and rectum was noted. SpaceOAR   
hydrogel was prepared as   
described in the  s   
Instructions For Use while the   
patient was prepped. With the   
subject maintained in the dorsal   
lithotomy position, the   
transrectal ultrasound (TRUS)   
probe was positioned to enable   
visual guidance of the needle   
into the space between the   
prostate and the rectum.  
Under transrectal ultrasound   
guidance, the 15 cm 18G needle   
was inserted through the   
rectourethralis muscle and the   
needle tip advanced into the   
perirectal fat inferior to the   
prostate all by using a   
transperineal approach and with   
side-fire transrectal ultrasound   
guidance. The needle position   
was confirmed in both sagittal   
and axial fields. Saline was   
used to dissect the space   
between the Denonvilliers fascia   
and anterior rectal wall (   
hydrodissection ). A space was   
created with hydrodissection.  
With the needle tip at mid   
gland, the axial field was   
viewed to confirm the needle was   
not in the rectal wall (movement   
of the needle tip without   
corresponding movement of the   
rectal wall will confirm   
perirectal placement). While   
maintaining the desired   
position, aspiration was done to   
ensure that the needle was not   
in vascular space. The assembled   
SpaceOAR delivery system was   
then attached to the 18G needle.  
Under ultrasound guidance   
(sagittal plane), a smooth,   
continuous injection technique   
was used to dispense the   
SpaceOAR hydrogel into the space   
between the prostate and rectum   
(Denonvilliers  fascia and the   
anterior rectal wall). The   
entire syringe contents (10 mL   
total) were injected without   
stopping. Optimal visualization   
of the needle during hydrogel   
administration was maintained at   
all times.  
An axial measurement of the   
space between the prostate (mid   
gland) and rectum immediately   
post-SpaceOAR injection was   
noted.  
No suspected penetration or   
compromise of the rectal wall   
occurred.  
Post-Service  
He will continue ciprofloxacin   
500 mg twice daily until gone   
and follow-up next week for his   
pre IMRT imaging for planning   
with Radiation Oncologist  
  
The patient tolerated the   
procedure well. There were no   
complications.  
  
Attending Attestation: I was   
present and scrubbed for the   
entire procedure.  
  
Xin Suresh  
Phone Number: [phone]  
  
Electronically signed by Xin Suresh MD at 2025 1:31 PM   
EST  
                                        [facility]  
Work Phone:   
[phone]  
   
                                                    2025 Miscellaneous   
Notes                                   Formatting of this note is   
different from the original.  
Insertion Fiducial Marker   
Prostate Operative Note  
  
Date: 2025 OR Location: [facility]   
OR  
  
Name: Xin Wesley, :   
1960, Age: 64 y.o., MRN:   
[id number], Sex: male  
  
Diagnosis  
Pre-op Diagnosis  
* Prostate cancer (Multi) [C61]   
Post-op Diagnosis  
* Prostate cancer (Multi) [C61]  
  
Procedures  
Insertion Fiducial Marker   
Prostate  
91384 - [place] PLMT INTERSTITIAL DEV   
RADIAT TX PROSTATE 1/MULT  
  
Surgeons  
* Xin Suresh - Primary  
  
Resident/Fellow/Other Assistant:  
Surgeons and Role:  
* No surgeons found with a   
matching role *  
  
Staff:  
Circulator: Asmita Herndon Person: Skyla  
  
Anesthesia Staff:   
Anesthesiologist: Lionel Pierce MD  
  
Procedure Summary  
Anesthesia: Anesthesia type not   
filed in the log. ASA: II  
Estimated Blood Loss: 0mL  
Intra-op Medications:   
Administrations occurring from   
1230 to 1300 on 25:  
* No intraprocedure medications   
in log *  
  
  
Anesthesia Record  
  
  
  
  
Intraprocedure I/O Totals  
  
None  
  
  
  
Specimen: No specimens collected  
  
  
Drains and/or Catheters: * None   
in log *  
  
Tourniquet Times:  
  
  
Implants:  
Implants  
  
Type Name Action Serial No.  
Implant GOLD MARKERS, 1.2MM,   
SOFT TISSUE, 20CM 17GA NEEDLES,   
3-PK, STRL - SN/A - [id number]   
Implanted N/A  
  
  
  
  
Indications: Xin Wesley is an   
64 y.o. male who is having   
surgery for Prostate cancer   
(Multi) [C61].  
  
The patient was seen in the   
preoperative area. The risks,   
benefits, complications,   
treatment options, non-operative   
alternatives, expected recovery   
and outcomes were discussed with   
the patient. The possibilities   
of reaction to medication,   
pulmonary aspiration, injury to   
surrounding structures,   
bleeding, recurrent infection,   
the need for additional   
procedures, failure to diagnose   
a condition, and creating a   
complication requiring   
transfusion or operation were   
discussed with the patient. The   
patient concurred with the   
proposed plan, giving informed   
consent. The site of surgery was   
properly noted/marked if   
necessary per policy. The   
patient has been actively warmed   
in preoperative area.  
Anesthetic: General  
  
Estimated blood loss: Minimal.  
  
Complications: None.  
  
Pre-Op Diagnosis: Prostate   
cancer.  
  
Post-Op Diagnosis: Prostate   
cancer.  
  
Operation: ULTRASOUND GUIDED   
TRANSPERNINEAL PLACEMENT OF   
SPACEOAR AND FIDUCIAL MARKERS  
  
Physician: Kerwin  
  
Anesthesia: General  
  
INDICATIONS AND CONSENT:  
patient has a history of   
prostate cancer who now presents   
for treatment. After the risks,   
benefits, alternatives and   
indications for the procedure   
were explained, he consented.  
  
PROCEDURE:  
The subject was positioned in   
the dorsal lithotomy position. A   
bilateral pudendal nerve block   
was performed using standard   
technique (1% lidocaine   
solution) The needle was   
advanced to the mid perineum   
region and an adequate dose of   
Lidocaine was injected. Once   
this area became anesthetized   
(~5 min), the needle was   
advanced until it was proximal   
to the pudendal nerve, and an   
additional dose of Lidocaine was   
injected. Once the area was   
completely anesthetized (~5   
min), placement of SpaceOAR   
commence.  
Intra-Service  
Prior to needle insertion, an   
axial measurement of the space   
between the prostate (mid gland)   
and rectum was noted. SpaceOAR   
hydrogel was prepared as   
described in the  s   
Instructions For Use while the   
patient was prepped. With the   
subject maintained in the dorsal   
lithotomy position, the   
transrectal ultrasound (TRUS)   
probe was positioned to enable   
visual guidance of the needle   
into the space between the   
prostate and the rectum.  
Under transrectal ultrasound   
guidance, the 15 cm 18G needle   
was inserted through the   
rectourethralis muscle and the   
needle tip advanced into the   
perirectal fat inferior to the   
prostate all by using a   
transperineal approach and with   
side-fire transrectal ultrasound   
guidance. The needle position   
was confirmed in both sagittal   
and axial fields. Saline was   
used to dissect the space   
between the Denonvilliers fascia   
and anterior rectal wall (   
hydrodissection ). A space was   
created with hydrodissection.  
With the needle tip at mid   
gland, the axial field was   
viewed to confirm the needle was   
not in the rectal wall (movement   
of the needle tip without   
corresponding movement of the   
rectal wall will confirm   
perirectal placement). While   
maintaining the desired   
position, aspiration was done to   
ensure that the needle was not   
in vascular space. The assembled   
SpaceOAR delivery system was   
then attached to the 18G needle.  
Under ultrasound guidance   
(sagittal plane), a smooth,   
continuous injection technique   
was used to dispense the   
SpaceOAR hydrogel into the space   
between the prostate and rectum   
(Denonvilliers fascia and the   
anterior rectal wall). The   
entire syringe contents (10 mL   
total) were injected without   
stopping. Optimal visualization   
of the needle during hydrogel   
administration was maintained at   
all times.  
An axial measurement of the   
space between the prostate (mid   
gland) and rectum immediately   
post-SpaceOAR injection was   
noted.  
No suspected penetration or   
compromise of the rectal wall   
occurred.  
Post-Service  
He will continue ciprofloxacin   
500 mg twice daily until gone   
and follow-up next week for his   
pre IMRT imaging for planning   
with Radiation Oncologist  
  
The patient tolerated the   
procedure well. There were no   
complications.  
  
Attending Attestation: I was   
present and scrubbed for the   
entire procedure.  
  
Xin Suresh  
Phone Number: [phone]  
  
Electronically signed by Xin Suresh MD at 2025 1:31 PM   
EST  
Formatting of this note is   
different from the original.  
No outpatient medications have   
been marked as taking for the   
25 encounter (Hospital   
Encounter).  
  
  
  
  
  
NPO Instructions:  
  
Do not eat any food after   
midnight the night before your   
surgery/procedure.  
You may have clear liquids until   
TWO hours before   
surgery/procedure. This includes   
water, black tea/coffee, (no   
milk or cream) apple juice and   
electrolyte drinks (Gatorade).  
  
Additional Instructions:  
  
Will need  home, will   
receive call day before surgery   
with arrival time  
  
  
Electronically signed by Venus Najera RN at 2025 12:27 PM   
EST  
documented in this encounter            [facility]  
Work Phone:   
[phone]  
   
                                                    2025 History and   
physical note                           Formatting of this note is   
different from the original.  
History Of Present Illness  
Xin Wesley is a 64 y.o. male   
presenting with prostate cancer.  
  
Past Medical History  
Past Medical History:  
Diagnosis Date  
GERD (gastroesophageal reflux   
disease)  
Hyperlipidemia  
Hypertension  
Type 2 diabetes mellitus  
  
Surgical History  
Past Surgical History:  
Procedure Laterality Date  
HERNIA REPAIR Right  
recurrent hernia repair with   
mesh  
PROSTATE BIOPSY  
SKIN GRAFT  
  
  
Social History  
He reports that he has never   
smoked. He has never used   
smokeless tobacco. He reports   
that he does not currently use   
alcohol. He reports current drug   
use. Frequency: 5.00 times per   
week. Drug: Marijuana.  
  
Family History  
Family History  
Problem Relation Name Age of   
Onset  
No Known Problems Mother  
No Known Problems Father  
  
  
Allergies  
Patient has no known allergies.  
  
Review of Systems  
Constitutional: Negative for   
chills and fever.  
HENT: Negative.  
Eyes: Negative.  
Respiratory: Negative for cough   
and shortness of breath.  
Cardiovascular: Negative for   
chest pain and leg swelling.  
Gastrointestinal: Negative for   
nausea.  
Endocrine: Negative.  
Genitourinary: Negative for   
difficulty urinating.  
Negative except for documented   
in HPI  
Allergic/Immunologic: Negative.  
Neurological:  
Alert & oriented X 3  
Hematological:  
Denies blood thinners  
Psychiatric/Behavioral:   
Negative.  
  
  
Physical Exam  
Vitals and nursing note   
reviewed.  
Pulmonary:  
Effort: Pulmonary effort is   
normal.  
Abdominal:  
Palpations: Abdomen is soft.  
Tenderness: There is no   
abdominal tenderness.  
Genitourinary:  
Comments: Kidneys non palpable   
bilaterally  
Bladder non palpable or tender  
Neurological:  
Mental Status: He is alert.  
  
  
Last Recorded Vitals  
Weight 88.5 kg (195 lb).  
  
Assessment/Plan  
Assessment & Plan  
Prostate cancer (Multi)  
  
Xin Suresh MD  
  
Electronically signed by Xin Suresh MD at 2025 10:57 AM   
EST  
                                        [facility]  
Work Phone:   
[phone]  
   
                                                    2025 History and   
physical note                           Formatting of this note is   
different from the original.  
History Of Present Illness  
Xin Wesley is a 64 y.o. male   
presenting with prostate cancer.  
  
Past Medical History  
Past Medical History:  
Diagnosis Date  
GERD (gastroesophageal reflux   
disease)  
Hyperlipidemia  
Hypertension  
Type 2 diabetes mellitus  
  
Surgical History  
Past Surgical History:  
Procedure Laterality Date  
HERNIA REPAIR Right  
recurrent hernia repair with   
mesh  
PROSTATE BIOPSY  
SKIN GRAFT  
  
  
Social History  
He reports that he has never   
smoked. He has never used   
smokeless tobacco. He reports   
that he does not currently use   
alcohol. He reports current drug   
use. Frequency: 5.00 times per   
week. Drug: Marijuana.  
  
Family History  
Family History  
Problem Relation Name Age of   
Onset  
No Known Problems Mother  
No Known Problems Father  
  
  
Allergies  
Patient has no known allergies.  
  
Review of Systems  
Constitutional: Negative for   
chills and fever.  
HENT: Negative.  
Eyes: Negative.  
Respiratory: Negative for cough   
and shortness of breath.  
Cardiovascular: Negative for   
chest pain and leg swelling.  
Gastrointestinal: Negative for   
nausea.  
Endocrine: Negative.  
Genitourinary: Negative for   
difficulty urinating.  
Negative except for documented   
in HPI  
Allergic/Immunologic: Negative.  
Neurological:  
Alert & oriented X 3  
Hematological:  
Denies blood thinners  
Psychiatric/Behavioral:   
Negative.  
  
  
Physical Exam  
Vitals and nursing note   
reviewed.  
Pulmonary:  
Effort: Pulmonary effort is   
normal.  
Abdominal:  
Palpations: Abdomen is soft.  
Tenderness: There is no   
abdominal tenderness.  
Genitourinary:  
Comments: Kidneys non palpable   
bilaterally  
Bladder non palpable or tender  
Neurological:  
Mental Status: He is alert.  
  
  
Last Recorded Vitals  
Weight 88.5 kg (195 lb).  
  
Assessment/Plan  
Assessment & Plan  
Prostate cancer (Multi)  
  
Xin Suresh MD  
  
Electronically signed by Xin Suresh MD at 2025 10:57 AM   
EST  
documented in this encounter            [facility]  
Work Phone:   
[phone]  
   
                                        2025 Note     Formatting of this n  
ote is   
different from the original.  
No outpatient medications have   
been marked as taking for the   
25 encounter (Hospital   
Encounter).  
  
  
  
  
  
NPO Instructions:  
  
Do not eat any food after   
midnight the night before your   
surgery/procedure.  
You may have clear liquids until   
TWO hours before   
surgery/procedure. This includes   
water, black tea/coffee, (no   
milk or cream) apple juice and   
electrolyte drinks (Gatorade).  
  
Additional Instructions:  
  
Will need  home, will   
receive call day before surgery   
with arrival time  
  
  
Electronically signed by Venus Najera RN at 2025 12:27 PM   
EST  
                                        [facility]  
Work Phone:   
[phone]  
   
                                                    2024 History of   
Present illness Narrative               Formatting of this note might be   
different from the original.  
Subjective  
Patient ID: Xin Wesley is a 63   
y.o. male.  
  
HPI  
Patient is here for prostate MRI   
fusion bx results. Path showed   
prostate cancer. [place] 7. Most   
recent PSA was 8.01 ()   
Previous PSA was 5.51 on . .   
Prior PSA was 6.70 on .   
Prior PSA was 2.87 on 2020.   
Prior PSA was 2.35 in .   
Prior PSA was 2.14 in 2018. MRI   
on  showed PI-RAD 2. No fhx   
of prostate cancer. Never had a   
bx. Chronic BPH sx are mild and   
stable. Some urgency and   
frequency. Denies dysuria. Some   
hematuria since the biopsy.   
Nocturia x2-3. No medication for   
LUT'S. ED is chronic. [facility]   
PRN.  
  
  
Review of Systems  
Constitutional: Negative for   
chills and fever.  
HENT: Negative.  
Eyes: Negative.  
Respiratory: Negative for cough   
and shortness of breath.  
Cardiovascular: Negative for   
chest pain and leg swelling.  
Gastrointestinal: Negative for   
nausea.  
Endocrine: Negative.  
Genitourinary: Negative for   
difficulty urinating.  
Negative except for documented   
in HPI  
Allergic/Immunologic: Negative.  
Neurological:  
Alert & oriented X 3  
Hematological:  
Denies blood thinners  
Psychiatric/Behavioral:   
Negative.  
  
Objective  
Physical Exam  
  
Assessment/Plan  
Diagnoses and all orders for   
this visit:  
Nocturia  
Elevated PSA  
Erectile dysfunction,   
unspecified erectile dysfunction   
type  
Malignant neoplasm of prostate   
(Multi)  
  
All available PSA values   
reviewed, Options discussed.   
Questions answered.  
Path report reviewed. Tx Options   
discussed. Pros/cons of tx   
options reviewed. Questions   
answered  
Referral to Dr Tubbs to   
discuss XRT  
Pros/cons of Active Surveillance   
discussed  
Diet changes for prostate health   
discussed and educational   
information given. Pros/Cons of   
prostate health supplements   
discussed.  
Treatment options for LUTS   
reviewed  
Discussed timed voiding.   
Discussed fluid and caffeine   
intake  
Treatment options for ED   
reviewed.  
Continue TriMix-refills faxed  
  
F/U 4 months.  
  
Electronically signed by Xin Suresh MD at 2024 11:33 AM   
EST  
documented in this encounter            [facility]  
Work Phone:   
[phone]  
   
                                                    2024 Miscellaneous   
Notes                                   Formatting of this note is   
different from the original.  
Biopsy Prostate with Navigation,   
Ultrasonography Transrectal   
Prostate, Ultrasound guidance   
for prostate fusion biopsy   
Operative Note  
  
Date: 2024 OR Location:   
[facility] OR  
  
Name: Xin Wesley, :   
1960, Age: 63 y.o., MRN:   
[id number], Sex: male  
  
Diagnosis  
Pre-op Diagnosis  
* Elevated PSA [R97.20] Post-op   
Diagnosis  
* Elevated PSA [R97.20]  
  
Procedures  
Biopsy Prostate with Navigation  
29940 - MN PROSTATE NEEDLE   
BIOPSY ANY APPROACH  
  
Ultrasonography Transrectal   
Prostate  
37395 - CHG US TRANSRECTAL  
  
Ultrasound guidance for prostate   
fusion biopsy  
86180 - CHG US GUIDANCE NEEDLE   
PLACEMENT IMG S&I  
  
Surgeons  
* Xin Suresh - Primary  
  
Resident/Fellow/Other Assistant:  
Surgeons and Role:  
* No surgeons found with a   
matching role *  
  
Staff:  
Circulator: Asmita Herndon Person: Skyla Romeoulator: Pilar  
  
Anesthesia Staff:   
Anesthesiologist: Lionel Pierce MD  
  
Procedure Summary  
Anesthesia: Monitor Anesthesia   
Care ASA: II  
Estimated Blood Loss: 0mL  
Intra-op Medications:  
Administrations occurring from   
0730 to 0745 on 24:  
Medication Name Total Dose  
ketamine injection 50 mg/ 5 mL   
(10 mg/mL) 30 mg  
propofol (Diprivan) injection 10   
mg/mL 76.68 mg  
  
  
Anesthesia Record  
  
  
  
  
Intraprocedure I/O Totals  
  
None  
  
  
  
Specimen:  
ID Type Source Tests Collected   
by Time  
1 : PROSTATE NEEDLE BIOPSY RIGHT   
Tissue PROSTATE NEEDLE BIOPSY   
RIGHT SURGICAL PATHOLOGY EXAM   
Xin Suresh MD 2024 0745  
2 : PROSTATE NEEDLE BIOPSY LEFT   
Tissue PROSTATE NEEDLE BIOPSY   
LEFT SURGICAL PATHOLOGY EXAM   
Xin Suresh MD 2024 0745  
3 : AREA OF INTEREST#1 Tissue   
PROSTATE BIOPSY TARGETED ZULMA   
SURGICAL PATHOLOGY EXAM Xni Suresh MD 2024 0745  
  
  
  
Drains and/or Catheters: * None   
in log *  
  
Tourniquet Times:  
  
  
Indications: Xin Wesley is an   
63 y.o. male who is having   
surgery for Elevated PSA   
[R97.20].  
  
The patient was seen in the   
preoperative area. The risks,   
benefits, complications,   
treatment options, non-operative   
alternatives, expected recovery   
and outcomes were discussed with   
the patient. The possibilities   
of reaction to medication,   
pulmonary aspiration, injury to   
surrounding structures,   
bleeding, recurrent infection,   
the need for additional   
procedures, failure to diagnose   
a condition, and creating a   
complication requiring   
transfusion or operation were   
discussed with the patient. The   
patient concurred with the   
proposed plan, giving informed   
consent. The site of surgery was   
properly noted/marked if   
necessary per policy. The   
patient has been actively warmed   
in preoperative area.  
Pre Op dx: Elevated PSA and   
Abnormal MRI of the prostate  
  
Post Op Dx: SAME  
  
Procedure: MRI Guided Fusion Bx   
of the prostate  
  
Physician: KERWIN  
  
Anesthesia: MAC  
  
Estimated Blood Loss: Minimal  
  
Complications: NONE  
  
Indications and Consent: Patient   
present for prostate Biopsy of   
lesion found on MRI. After the   
risks,, benefits, and   
indications were explained he   
consented to the procedure.  
  
PROCEDURE: After adequate   
sedation was obtained the   
ultrasound probe was inserted   
into the rectum. An ultrasound   
sweep of the prostate was   
performed. These images were   
then fused with the previously   
obtained MRI images. The   
lesion(s) were identified.   
Multiple targeted biopsies were   
obtained . I also performed   
standard Sextant biopsies of the   
right and left lobe of the   
prostate. The patient tolerated   
the procedure well.  
  
Attending Attestation: I was   
present for the entire   
procedure.  
  
Xin Suresh  
Phone Number: [phone]  
  
Electronically signed by Xin Suresh MD at 2024 8:20 AM   
EST  
Formatting of this note is   
different from the original.  
No outpatient medications have   
been marked as taking for the   
24 encounter (Hospital   
Encounter).  
  
  
  
  
  
NPO Instructions:  
  
Do not eat any food after   
midnight the night before your   
surgery/procedure.  
You may have clear liquids until   
TWO hours before   
surgery/procedure. This includes   
water, black tea/coffee, (no   
milk or cream) apple juice and   
electrolyte drinks (Gatorade).  
  
Additional Instructions:  
  
Will need  home, will   
receive call day before surgery   
with arrival time  
  
  
Electronically signed by Venus Najera RN at 2024 1:03 PM   
EST  
documented in this encounter            [facility]  
Work Phone:   
[phone]  
   
                                        2024 Note     Formatting of this n  
ote is   
different from the original.  
Biopsy Prostate with Navigation,   
Ultrasonography Transrectal   
Prostate, Ultrasound guidance   
for prostate fusion biopsy   
Operative Note  
  
Date: 2024 OR Location:   
[facility] OR  
  
Name: Xin Wesley, :   
1960, Age: 63 y.o., MRN:   
[id number], Sex: male  
  
Diagnosis  
Pre-op Diagnosis  
* Elevated PSA [R97.20] Post-op   
Diagnosis  
* Elevated PSA [R97.20]  
  
Procedures  
Biopsy Prostate with Navigation  
04310 - MN PROSTATE NEEDLE   
BIOPSY ANY APPROACH  
  
Ultrasonography Transrectal   
Prostate  
02847 - CHG US TRANSRECTAL  
  
Ultrasound guidance for prostate   
fusion biopsy  
77526 - CHG US GUIDANCE NEEDLE   
PLACEMENT IMG S&I  
  
Surgeons  
* Xin Suresh - Primary  
  
Resident/Fellow/Other Assistant:  
Surgeons and Role:  
* No surgeons found with a   
matching role *  
  
Staff:  
Ousmaneulator: Asmita Herndon Person: Skyla  
Circulator: Pilar  
  
Anesthesia Staff:   
Anesthesiologist: Lionel Pierce MD  
  
Procedure Summary  
Anesthesia: Monitor Anesthesia   
Care ASA: II  
Estimated Blood Loss: 0mL  
Intra-op Medications:  
Administrations occurring from   
0730 to 0745 on 24:  
Medication Name Total Dose  
ketamine injection 50 mg/ 5 mL   
(10 mg/mL) 30 mg  
propofol (Diprivan) injection 10   
mg/mL 76.68 mg  
  
  
Anesthesia Record  
  
  
  
  
Intraprocedure I/O Totals  
  
None  
  
  
  
Specimen:  
ID Type Source Tests Collected   
by Time  
1 : PROSTATE NEEDLE BIOPSY RIGHT   
Tissue PROSTATE NEEDLE BIOPSY   
RIGHT SURGICAL PATHOLOGY EXAM   
Xin Suresh MD 2024 0745  
2 : PROSTATE NEEDLE BIOPSY LEFT   
Tissue PROSTATE NEEDLE BIOPSY   
LEFT SURGICAL PATHOLOGY EXAM   
Xin Suresh MD 2024 0745  
3 : AREA OF INTEREST#1 Tissue   
PROSTATE BIOPSY TARGETED ZULMA   
SURGICAL PATHOLOGY EXAM Xin Suresh MD 2024 0745  
  
  
  
Drains and/or Catheters: * None   
in log *  
  
Tourniquet Times:  
  
  
Indications: Xin Wesley is an   
63 y.o. male who is having   
surgery for Elevated PSA   
[R97.20].  
  
The patient was seen in the   
preoperative area. The risks,   
benefits, complications,   
treatment options, non-operative   
alternatives, expected recovery   
and outcomes were discussed with   
the patient. The possibilities   
of reaction to medication,   
pulmonary aspiration, injury to   
surrounding structures,   
bleeding, recurrent infection,   
the need for additional   
procedures, failure to diagnose   
a condition, and creating a   
complication requiring   
transfusion or operation were   
discussed with the patient. The   
patient concurred with the   
proposed plan, giving informed   
consent. The site of surgery was   
properly noted/marked if   
necessary per policy. The   
patient has been actively warmed   
in preoperative area.  
Pre Op dx: Elevated PSA and   
Abnormal MRI of the prostate  
  
Post Op Dx: SAME  
  
Procedure: MRI Guided Fusion Bx   
of the prostate  
  
Physician: KERWIN  
  
Anesthesia: MAC  
  
Estimated Blood Loss: Minimal  
  
Complications: NONE  
  
Indications and Consent: Patient   
present for prostate Biopsy of   
lesion found on MRI. After the   
risks,, benefits, and   
indications were explained he   
consented to the procedure.  
  
PROCEDURE: After adequate   
sedation was obtained the   
ultrasound probe was inserted   
into the rectum. An ultrasound   
sweep of the prostate was   
performed. These images were   
then fused with the previously   
obtained MRI images. The   
lesion(s) were identified.   
Multiple targeted biopsies were   
obtained . I also performed   
standard Sextant biopsies of the   
right and left lobe of the   
prostate. The patient tolerated   
the procedure well.  
  
Attending Attestation: I was   
present for the entire   
procedure.  
  
Xin Suresh  
Phone Number: [phone]  
  
Electronically signed by Xin Suresh MD at 2024 8:20 AM   
EST  
                                        [facility]  
Work Phone:   
[phone]  
   
                                                    2024 History and   
physical note                           Formatting of this note is   
different from the original.  
History Of Present Illness  
Xin Wesley is a 63 y.o. male   
presenting with elevated PSA.  
  
Past Medical History  
Past Medical History:  
Diagnosis Date  
GERD (gastroesophageal reflux   
disease)  
Hyperlipidemia  
Hypertension  
Type 2 diabetes mellitus  
  
Surgical History  
Past Surgical History:  
Procedure Laterality Date  
HERNIA REPAIR Right  
recurrent hernia repair with   
mesh  
SKIN GRAFT  
  
  
Social History  
He reports that he has never   
smoked. He has never used   
smokeless tobacco. He reports   
that he does not currently use   
alcohol. He reports current drug   
use. Frequency: 5.00 times per   
week. Drug: Marijuana.  
  
Family History  
Family History  
Problem Relation Name Age of   
Onset  
No Known Problems Mother  
No Known Problems Father  
  
  
Allergies  
Patient has no known allergies.  
  
Review of Systems  
Constitutional: Negative for   
chills and fever.  
HENT: Negative.  
Eyes: Negative.  
Respiratory: Negative for cough   
and shortness of breath.  
Cardiovascular: Negative for   
chest pain and leg swelling.  
Gastrointestinal: Negative for   
nausea.  
Endocrine: Negative.  
Genitourinary: Negative for   
difficulty urinating.  
Negative except for documented   
in HPI  
Allergic/Immunologic: Negative.  
Neurological:  
Alert & oriented X 3  
Hematological:  
Denies blood thinners  
Psychiatric/Behavioral:   
Negative.  
  
  
Physical Exam  
Vitals and nursing note   
reviewed.  
Pulmonary:  
Effort: Pulmonary effort is   
normal.  
Abdominal:  
Palpations: Abdomen is soft.  
Tenderness: There is no   
abdominal tenderness.  
Genitourinary:  
Comments: Kidneys non palpable   
bilaterally  
Bladder non palpable or tender  
Neurological:  
Mental Status: He is alert.  
  
  
Last Recorded Vitals  
Blood pressure 131/85, pulse 80,   
temperature 36.1 C (97 F),   
temperature source Temporal,   
resp. rate 12, height 1.727 m   
(5' 8 ), weight 85.2 kg (187 lb   
13.3 oz), SpO2 96%.  
  
Assessment/Plan  
Assessment & Plan  
Elevated PSA  
  
HTN (hypertension)  
  
Diabetes mellitus, type 2   
(Multi)  
  
Xin Suresh MD  
  
Electronically signed by Xin Suresh MD at 2024 7:19 AM   
EST  
                                        [facility]  
Work Phone:   
[phone]  
   
                                                    2024 History and   
physical note                           Formatting of this note is   
different from the original.  
History Of Present Illness  
Xin Wesley is a 63 y.o. male   
presenting with elevated PSA.  
  
Past Medical History  
Past Medical History:  
Diagnosis Date  
GERD (gastroesophageal reflux   
disease)  
Hyperlipidemia  
Hypertension  
Type 2 diabetes mellitus  
  
Surgical History  
Past Surgical History:  
Procedure Laterality Date  
HERNIA REPAIR Right  
recurrent hernia repair with   
mesh  
SKIN GRAFT  
  
  
Social History  
He reports that he has never   
smoked. He has never used   
smokeless tobacco. He reports   
that he does not currently use   
alcohol. He reports current drug   
use. Frequency: 5.00 times per   
week. Drug: Marijuana.  
  
Family History  
Family History  
Problem Relation Name Age of   
Onset  
No Known Problems Mother  
No Known Problems Father  
  
  
Allergies  
Patient has no known allergies.  
  
Review of Systems  
Constitutional: Negative for   
chills and fever.  
HENT: Negative.  
Eyes: Negative.  
Respiratory: Negative for cough   
and shortness of breath.  
Cardiovascular: Negative for   
chest pain and leg swelling.  
Gastrointestinal: Negative for   
nausea.  
Endocrine: Negative.  
Genitourinary: Negative for   
difficulty urinating.  
Negative except for documented   
in HPI  
Allergic/Immunologic: Negative.  
Neurological:  
Alert & oriented X 3  
Hematological:  
Denies blood thinners  
Psychiatric/Behavioral:   
Negative.  
  
  
Physical Exam  
Vitals and nursing note   
reviewed.  
Pulmonary:  
Effort: Pulmonary effort is   
normal.  
Abdominal:  
Palpations: Abdomen is soft.  
Tenderness: There is no   
abdominal tenderness.  
Genitourinary:  
Comments: Kidneys non palpable   
bilaterally  
Bladder non palpable or tender  
Neurological:  
Mental Status: He is alert.  
  
  
Last Recorded Vitals  
Blood pressure 131/85, pulse 80,   
temperature 36.1 C (97 F),   
temperature source Temporal,   
resp. rate 12, height 1.727 m   
(5' 8 ), weight 85.2 kg (187 lb   
13.3 oz), SpO2 96%.  
  
Assessment/Plan  
Assessment & Plan  
Elevated PSA  
  
HTN (hypertension)  
  
Diabetes mellitus, type 2   
(Multi)  
  
Xin Suresh MD  
  
Electronically signed by Xin Suresh MD at 2024 7:19 AM   
EST  
documented in this encounter            [facility]  
Work Phone:   
[phone]  
   
                                        2024 Note     Formatting of this n  
ote is   
different from the original.  
No outpatient medications have   
been marked as taking for the   
24 encounter (Hospital   
Encounter).  
  
  
  
  
  
NPO Instructions:  
  
Do not eat any food after   
midnight the night before your   
surgery/procedure.  
You may have clear liquids until   
TWO hours before   
surgery/procedure. This includes   
water, black tea/coffee, (no   
milk or cream) apple juice and   
electrolyte drinks (Gatorade).  
  
Additional Instructions:  
  
Will need  home, will   
receive call day before surgery   
with arrival time  
  
  
Electronically signed by Venus Najera RN at 2024 1:03 PM   
EST  
                                        [facility]  
Work Phone:   
[phone]  
   
                                                    10- History of   
Present illness Narrative               Formatting of this note might be   
different from the original.  
Subjective  
Patient ID: Xin Wesley is a 63   
y.o. male.  
  
Virtual or Telephone Consent  
  
An interactive audio and video   
telecommunication system which   
permits real time communications   
between the patient (at the   
originating site) and provider   
(at the distant site) was   
utilized to provide this   
telehealth service.  
Verbal consent was requested and   
obtained from Xin Wesley on   
this date, 10/31/24 for a   
telehealth visit.  
  
HPI  
Patient is here for prostate MRI   
results. MRI showed PI-RAD 4   
lesion. Most recent PSA was 8.01   
() Previous PSA was 5.51 on   
. . Prior PSA was 6.70 on   
. Prior PSA was 2.87 on   
2020. Prior PSA was 2.35 in   
2019. Prior PSA was 2.14 in   
2018. MRI on  showed PI-RAD   
2. No fhx of prostate cancer.   
Never had a bx. Chronic BPH sx   
are mild and stable. Some   
urgency and frequency. Denies   
dysuria. Denies hematuria.   
Nocturia x2-3. No medication for   
LUT'S. ED is chronic. Trimix   
PRN.  
  
Review of Systems  
Constitutional: Negative for   
chills and fever.  
HENT: Negative.  
Eyes: Negative.  
Respiratory: Negative for cough   
and shortness of breath.  
Cardiovascular: Negative for   
chest pain and leg swelling.  
Gastrointestinal: Negative for   
nausea.  
Endocrine: Negative.  
Genitourinary: Negative for   
difficulty urinating.  
Negative except for documented   
in HPI  
Allergic/Immunologic: Negative.  
Neurological:  
Alert & oriented X 3  
Hematological:  
Denies blood thinners  
Psychiatric/Behavioral:   
Negative.  
  
Objective  
Physical Exam  
No PE done given the virtual   
nature of visit.  
Assessment/Plan  
  
Diagnoses and all orders for   
this visit:  
Erectile dysfunction,   
unspecified erectile dysfunction   
type  
Elevated PSA  
Benign prostatic hyperplasia   
with urinary obstruction  
Nocturia  
  
All available PSA values   
reviewed, Options discussed.   
Questions answered.  
MRI reviewed x 2 and discussed   
change in MRI  
Pros and cons of prostate biopsy   
reviewed. Other options   
discussed. Questions answered  
MRI Fusion Bx scheduled  
Cipro rx given  
Diet changes for prostate health   
discussed and educational   
information given. Pros/Cons of   
prostate health supplements   
discussed.  
Treatment options for LUTS   
reviewed  
  
F/U MRI Fusion bx  
Electronically signed by Xin Suresh MD at 10/31/2024 9:41 AM   
EDT  
documented in this encounter            [facility]  
Work Phone:   
[phone]  
   
                                                    10- History of   
Present illness Narrative               Formatting of this note might be   
different from the original.  
Subjective  
Patient ID: Xin Wesley is a 63   
y.o. male.  
  
HPI  
Patient has hx of elevated PSA.   
Most recent PSA was 8.01 ()   
Previous PSA was 5.51 on . .   
Prior PSA was 6.70 on .   
Prior PSA was 2.87 on 2020.   
Prior PSA was 2.35 in .   
Prior PSA was 2.14 in 2018. MRI   
on  showed PI-RAD 2. No fhx   
of prostate cancer. Never had a   
bx. Chronic BPH sx are mild and   
stable. Some urgency and   
frequency. Denies dysuria.   
Denies hematuria. Nocturia x2-3.   
No medication for LUT'S. ED is   
chronic. Trimix PRN.  
  
  
Review of Systems  
Constitutional: Negative for   
chills and fever.  
HENT: Negative.  
Eyes: Negative.  
Respiratory: Negative for cough   
and shortness of breath.  
Cardiovascular: Negative for   
chest pain and leg swelling.  
Gastrointestinal: Negative for   
nausea.  
Endocrine: Negative.  
Genitourinary: Negative for   
difficulty urinating.  
Negative except for documented   
in HPI  
Allergic/Immunologic: Negative.  
Neurological:  
Alert & oriented X 3  
Hematological:  
Denies blood thinners  
Psychiatric/Behavioral:   
Negative.  
  
Objective  
Physical Exam  
Vitals and nursing note   
reviewed.  
Constitutional:  
General: He is not in acute   
distress.  
Appearance: Normal appearance.  
Pulmonary:  
Effort: Pulmonary effort is   
normal.  
Abdominal:  
Tenderness: There is no   
abdominal tenderness.  
Genitourinary:  
Comments: Kidneys non palpable   
bilaterally  
Bladder non palpable or tender  
Scrotum no mass, No hydrocele  
Epididymis- No spermatocele. Non   
Tender.  
Testicles: No mass. WNL  
Urethra: No discharge  
Penis within normal limits... No   
lesions. circumcised  
Prostate - symmetric, no   
nodules. Perhaps firmer at APEX  
Seminal Vesicals: No mass.  
Sphincter tone: normal  
Neurological:  
Mental Status: He is alert.  
  
Assessment/Plan  
Diagnoses and all orders for   
this visit:  
Elevated PSA  
Benign prostatic hyperplasia   
with urinary obstruction  
Erectile dysfunction,   
unspecified erectile dysfunction   
type  
Nocturia  
  
All available PSA values   
reviewed, Options discussed.   
Questions answered.  
Past MRI reviewed  
New MRI ordered  
Pros and cons of prostate biopsy   
reviewed. Other options   
discussed. Questions answered  
Diet changes for prostate health   
discussed and educational   
information given. Pros/Cons of   
prostate health supplements   
discussed.  
[employer]  
Treatment options for LUTS   
reviewed  
Discussed timed voiding.   
Discussed fluid and caffeine   
intake  
Treatment options for ED   
reviewed.  
Continue TriMix-refills SENT  
Lifestyle change to help prevent   
UTIs discussed. Encouraged fluid   
intake.  
  
F/U After MRI  
  
  
Electronically signed by Xin Suresh MD at 10/09/2024 9:05 AM   
EDT  
documented in this encounter            [facility]  
Work Phone:   
[phone]  
   
                                        2024 Note     HNO ID: [id number]  
Author: KARY LAMB OD  
Service: ?  
Author Type: OPTOMETRIST  
Type: Progress Notes  
Filed: 2024 08:32  
Note Text:  
ASSESSMENT/PLAN:  
1. Type 2 diabetes mellitus   
without retinopathy (HCC) -   
ICD9: 250.00, ICD10:  
E11.9 (primary diagnosis)  
Examination shows no ocular   
diabetic complications today.   
Discussed need for  
optimal diabetes control to   
minimize chance of ocular   
complications. Advise  
patient to immediately report   
worsening in status or   
additional symptoms.  
Continue yearly dilated eye   
examinations.  
2. Combined form of senile   
cataract of both eyes - ICD9:   
366.19, ICD10: H25.813  
Mild cataract in both eyes. Well   
tolerated at this time.   
Discussed possible  
future affect on daily   
activities to watch for. Monitor   
as instructed.  
3. Floaters, right - ICD9:   
379.24, ICD10: H43.391  
4. Asteroid hyalosis of left eye   
- ICD9: 379.22, ICD10: H43.22  
Vitreal floaters stable both   
eyes. Retinas flat and intact   
with no apparent  
retinal tear or traction.   
Discussed symptoms of retinal   
tear/detachment and if  
seen patient will return to   
clinic without delay.  
5. Hypermetropia, bilateral -   
ICD9: 367.0, ICD10: H52.03  
6. Regular astigmatism of both   
eyes - ICD9: 367.21, ICD10:   
H52.223  
7. Presbyopia - ICD9: 367.4,   
ICD10: H52.4  
Continue to wear his glasses as   
desired.  
Recommended yearly exams.  
Kary Lamb, RILEY                [facility] [place]  
   
                                        2024 Instructions   
  
  
Kary Lamb, RILEY -   
2024 8:31 AM ESTFormatting   
of this note might be different   
from the original.  
ASSESSMENT/PLAN:  
1. Type 2 diabetes mellitus   
without retinopathy (HCC) -   
ICD9: 250.00, ICD10: E11.9   
(primary diagnosis)  
  
Examination shows no ocular   
diabetic complications today.   
Discussed need for optimal   
diabetes control to minimize   
chance of ocular complications.   
Advise patient to immediately   
report worsening in status or   
additional symptoms. Continue   
yearly dilated eye examinations.  
  
2. Combined form of senile   
cataract of both eyes - ICD9:   
366.19, ICD10: H25.813  
  
Mild cataract in both eyes. Well   
tolerated at this time.   
Discussed possible future affect   
on daily activities to watch   
for. Monitor as instructed.  
  
3. Floaters, right - ICD9:   
379.24, ICD10: H43.391  
4. Asteroid hyalosis of left eye   
- ICD9: 379.22, ICD10: H43.22  
  
Vitreal floaters stable both   
eyes. Retinas flat and intact   
with no apparent retinal tear or   
traction. Discussed symptoms of   
retinal tear/detachment and if   
seen patient will return to   
clinic without delay.  
  
5. Hypermetropia, bilateral -   
ICD9: 367.0, ICD10: H52.03  
6. Regular astigmatism of both   
eyes - ICD9: 367.21, ICD10:   
H52.223  
7. Presbyopia - ICD9: 367.4,   
ICD10: H52.4  
  
Continue to wear his glasses as   
desired.  
  
Recommended yearly exams.  
Electronically signed by   
Kary Lamb OD at   
2024 8:31 AM EST  
  
documented in this encounter            [facility]  
   
                                                    2024 History of   
Present illness Narrative               Formatting of this note might be   
different from the original.  
ASSESSMENT/PLAN:  
1. Type 2 diabetes mellitus   
without retinopathy (HCC) -   
ICD9: 250.00, ICD10: E11.9   
(primary diagnosis)  
  
Examination shows no ocular   
diabetic complications today.   
Discussed need for optimal   
diabetes control to minimize   
chance of ocular complications.   
Advise patient to immediately   
report worsening in status or   
additional symptoms. Continue   
yearly dilated eye examinations.  
  
2. Combined form of senile   
cataract of both eyes - ICD9:   
366.19, ICD10: H25.813  
  
Mild cataract in both eyes. Well   
tolerated at this time.   
Discussed possible future affect   
on daily activities to watch   
for. Monitor as instructed.  
  
3. Floaters, right - ICD9:   
379.24, ICD10: H43.391  
4. Asteroid hyalosis of left eye   
- ICD9: 379.22, ICD10: H43.22  
  
Vitreal floaters stable both   
eyes. Retinas flat and intact   
with no apparent retinal tear or   
traction. Discussed symptoms of   
retinal tear/detachment and if   
seen patient will return to   
clinic without delay.  
  
5. Hypermetropia, bilateral -   
ICD9: 367.0, ICD10: H52.03  
6. Regular astigmatism of both   
eyes - ICD9: 367.21, ICD10:   
H52.223  
7. Presbyopia - ICD9: 367.4,   
ICD10: H52.4  
  
Continue to wear his glasses as   
desired.  
  
Recommended yearly exams.  
  
Kary Lamb OD  
  
  
Electronically signed by   
Kary Lamb, RILEY at   
2024 8:32 AM EST  
documented in this encounter            [facility]  
   
                                                    2024 History of   
Present illness Narrative               Formatting of this note might be   
different from the original.  
Subjective  
Patient ID: Xin Wesley is a 63   
y.o. male.  
  
HPI  
Patient has hx of elevated PSA.   
Most recent PSA was 5.51 on   
. . Prior PSA was 6.70 on   
. Prior PSA was 2.87 on   
2020. Prior PSA was 2.35 in   
2019. Prior PSA was 2.14 in   
2018. MRI on  showed PI-RAD   
2. No fhx of prostate cancer.   
Never had a bx. Chronic BPH sx   
are mild and stable. Some   
urgency and frequency. Denies   
dysuria. Denies hematuria.   
Nocturia x2-3. No medication for   
LUT'S. ED is chronic. Trimix   
PRN.  
  
Review of Systems  
Constitutional: Negative for   
chills and fever.  
HENT: Negative.  
Eyes: Negative.  
Respiratory: Negative for cough   
and shortness of breath.  
Cardiovascular: Negative for   
chest pain and leg swelling.  
Gastrointestinal: Negative for   
nausea.  
Endocrine: Negative.  
Genitourinary: Negative for   
difficulty urinating.  
Negative except for documented   
in HPI  
Allergic/Immunologic: Negative.  
Neurological:  
Alert & oriented X 3  
Hematological:  
Denies blood thinners  
Psychiatric/Behavioral:   
Negative.  
  
Objective  
Physical Exam  
Vitals and nursing note   
reviewed.  
Constitutional:  
General: He is not in acute   
distress.  
Appearance: Normal appearance.  
Pulmonary:  
Effort: Pulmonary effort is   
normal.  
Abdominal:  
Tenderness: There is no   
abdominal tenderness.  
Genitourinary:  
Comments: Kidneys non palpable   
bilaterally  
Bladder non palpable or tender  
Scrotum no mass, No hydrocele  
Epididymis- No spermatocele. Non   
Tender.  
Testicles: No mass. WNL  
Urethra: No discharge  
Penis within normal limits... No   
lesions. circumcised  
Prostate - symmetric, no   
nodules. BEnign  
Seminal Vesicals: No mass.  
Sphincter tone: normal  
Neurological:  
Mental Status: He is alert.  
  
Assessment/Plan  
Diagnoses and all orders for   
this visit:  
Benign prostatic hyperplasia   
with urinary obstruction  
Elevated PSA  
Erectile dysfunction,   
unspecified erectile dysfunction   
type  
Nocturia  
  
All available PSA values   
reviewed, Options discussed.   
Questions answered.  
MRI reviewed  
Pros and cons of prostate biopsy   
reviewed. Other options   
discussed. Questions answered  
Theralogix given  
Diet changes for prostate health   
discussed and educational   
information given. Pros/Cons of   
prostate health supplements   
discussed.  
Treatment options for LUTS   
reviewed  
Discussed timed voiding.   
Discussed fluid and caffeine   
intake  
Treatment options for ED   
reviewed.  
TriMix Rx refilled  
Lifestyle change to help prevent   
UTIs discussed. Encouraged fluid   
intake.  
  
F/U with PSA 6 months  
  
  
Electronically signed by Xin Suresh MD at 2024 9:36 AM   
EST  
documented in this encounter            [facility]  
Work Phone:   
[phone]  
   
                                                    2023 History of   
Present illness Narrative               Patient is here for elevated   
PSA. Most recent PSA was 6.70 on   
. Prior PSA was 2.87 on   
2020. Prior PSA was 2.35 in   
. Prior PSA was 2.14 in   
2018. No fhx of prostate cancer.   
Never had a bx. Chronic BPH sx   
are mild and stable. Some   
urgency and frequency. Denies   
dysuria. Denies hematuria.   
Nocturia x2-3.. Patient states   
he is also having trouble   
ejaculating. ED is mild                 [facility]  
Work Phone:   
[phone]  
   
                                                    2023 History of   
Present illness Narrative               Patient is here for elevated   
PSA. Most recent PSA was 6.70 on   
. Prior PSA was 2.87 on   
2020. Prior PSA was 2.35 in   
2019. Prior PSA was 2.14 in   
2018. No fhx of prostate cancer.   
Never had a bx. Chronic BPH sx   
are mild and stable. Some   
urgency and frequency. Denies   
dysuria. Denies hematuria.   
Nocturia x2-3.. Patient states   
he is also having trouble   
ejaculating. ED is mild                 [facility]   
232 DO  
Work Phone:   
[phone]  
   
                                        10- Instructions   
  
  
Shantanu Lamb II, OD -   
10/12/2022 2:43 PM EDTFormatting   
of this note might be different   
from the original.  
Assessment and Plan  
  
E11.9 Type 2 diabetes mellitus   
without retinopathy (HCC)   
(primary encounter diagnosis)  
Comment: Examination shows no   
ocular diabetic complications   
today. Discussed need for   
optimal diabetes control to   
minimize chance of ocular   
complications. Advise patient to   
immediately report worsening in   
status or additional symptoms.   
Continue yearly dilated eye   
examinations.  
  
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   
eye  
Comment: Stable. Monitor.  
  
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  
H52.223 Regular astigmatism of   
both eyes  
H52.4 Presbyopia  
Comment: Small increase in   
glasses power. Update multifocal   
to maximize visual performance.  
  
I have confirmed and edited as   
necessary the relevant   
ophthalmic history, ROS, and the   
neuro exam findings as obtained   
by others. I have seen and   
examined Xin Wesley JR.  
I have discussed the case and   
the management of this patient's   
care with the Resident/Fellow,   
if applicable. I also have   
reviewed and agree with the   
assessment and plan as stated   
above and agree with all of its   
relevant components.  
  
Shantanu Lamb II, OD  
  
  
  
  
Electronically signed by Shantanu Lamb II, OD at 10/12/2022   
2:43 PM EDT  
  
documented in this encounter            [facility]  
   
                                                    10- History of   
Present illness Narrative               Formatting of this note might be   
different from the original.  
Assessment and Plan  
  
E11.9 Type 2 diabetes mellitus   
without retinopathy (HCC)   
(primary encounter diagnosis)  
Comment: Examination shows no   
ocular diabetic complications   
today. Discussed need for   
optimal diabetes control to   
minimize chance of ocular   
complications. Advise patient to   
immediately report worsening in   
status or additional symptoms.   
Continue yearly dilated eye   
examinations.  
  
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   
eye  
Comment: Stable. Monitor.  
  
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  
H52.223 Regular astigmatism of   
both eyes  
H52.4 Presbyopia  
Comment: Small increase in   
glasses power. Update multifocal   
to maximize visual performance.  
  
I have confirmed and edited as   
necessary the relevant   
ophthalmic history, ROS, and the   
neuro exam findings as obtained   
by others. I have seen and   
examined Xin Wesley JR.  
I have discussed the case and   
the management of this patient's   
care with the Resident/Fellow,   
if applicable. I also have   
reviewed and agree with the   
assessment and plan as stated   
above and agree with all of its   
relevant components.  
  
Shantanu Lamb II, RILEY  
  
  
Electronically signed by Shantanu Lamb II, OD at 10/12/2022   
2:44 PM EDT  
documented in this encounter            [facility]  
  
  
  
                                                    2016 History of Past i  
llness Narrative   
  
  
  
                                Problem         Noted Date      Resolved Date  
   
                                Unilateral inguinal hernia without obstruction o  
r gangrene 2016  
  
documented as of this encounter (statuses as of 10/12/2022)  
[facility]- History of Past illness Narrative*   
  
                          Problem      Noted Date   Diagnosed Date Resolved Date  
   
                                                    Unilateral inguinal hernia w  
ithout   
obstruction or gangrene 2016  
  
documented as of this encounter (statuses as of 2024)  
[facility]   
  
                                                    Diagnosis  
   
                                                      
  
  
Type 2 diabetes mellitus without retinopathy (HCC)- Primary  
  
  
Type II or unspecified type diabetes mellitus without mention of complication, 
not   
stated as uncontrolled  
   
                                                      
  
  
Asteroid hyalosis of left eye  
  
  
Crystalline deposits in vitreous  
   
                                                      
  
  
Hypermetropia, bilateral  
   
                                                      
  
  
Regular astigmatism of both eyes  
  
  
Regular astigmatism  
   
                                                      
  
  
Presbyopia  
  
documented in this encounter  
[facility]   
  
                                                    Diagnosis  
   
                                                      
  
  
Benign prostatic hyperplasia with urinary obstruction  
   
                                                      
  
  
Elevated PSA  
  
  
Elevated prostate specific antigen (PSA)  
   
                                                      
  
  
Erectile dysfunction, unspecified erectile dysfunction type  
   
                                                      
  
  
Nocturia  
  
documented in this encounter  
[facility]  
Work Phone: [phone] note*   
  
                                                    Diagnosis  
   
                                                      
  
  
Type 2 diabetes mellitus without retinopathy (HCC)- Primary  
  
  
Type II or unspecified type diabetes mellitus without mention of complication, 
not   
stated as uncontrolled  
   
                                                      
  
  
Combined form of senile cataract of both eyes  
   
                                                      
  
  
Floaters, right  
   
                                                      
  
  
Asteroid hyalosis of left eye  
  
  
Crystalline deposits in vitreous  
   
                                                      
  
  
Hypermetropia, bilateral  
   
                                                      
  
  
Regular astigmatism of both eyes  
  
  
Regular astigmatism  
   
                                                      
  
  
Presbyopia  
  
documented in this encounter  
[facility]   
  
                                                    Diagnosis  
   
                                                      
  
  
Elevated PSA  
  
  
Elevated prostate specific antigen (PSA)  
   
                                                      
  
  
Benign prostatic hyperplasia with urinary obstruction  
   
                                                      
  
  
Erectile dysfunction, unspecified erectile dysfunction type  
   
                                                      
  
  
Nocturia  
  
documented in this encounter  
[facility]  
Work Phone: [phone] note*   
  
                                                    Diagnosis  
   
                                                      
  
  
Elevated PSA  
  
  
Elevated prostate specific antigen (PSA)  
  
documented in this encounter  
[facility]  
Work Phone: [phone] note*   
  
                                                    Diagnosis  
   
                                                      
  
  
Erectile dysfunction, unspecified erectile dysfunction type  
   
                                                      
  
  
Elevated PSA  
  
  
Elevated prostate specific antigen (PSA)  
   
                                                      
  
  
Benign prostatic hyperplasia with urinary obstruction  
   
                                                      
  
  
Nocturia  
  
documented in this encounter  
[facility]  
Work Phone: [phone] note*   
  
                                                    Diagnosis  
   
                                                      
  
  
Elevated PSA- Primary  
  
  
Elevated prostate specific antigen (PSA)  
   
                                                      
  
  
Elevated PSA  
  
  
Elevated prostate specific antigen (PSA)  
   
                                                      
  
  
HTN (hypertension)  
  
  
Unspecified essential hypertension  
   
                                                      
  
  
Diabetes mellitus, type 2 (Multi)  
  
  
Type II or unspecified type diabetes mellitus without mention of complication, 
not   
stated as uncontrolled  
  
documented in this encounter  
[facility]  
Work Phone: [phone] note*   
  
                                                    Diagnosis  
   
                                                      
  
  
Nocturia  
   
                                                      
  
  
Elevated PSA  
  
  
Elevated prostate specific antigen (PSA)  
   
                                                      
  
  
Erectile dysfunction, unspecified erectile dysfunction type  
   
                                                      
  
  
Malignant neoplasm of prostate (Multi)  
  
  
Malignant neoplasm of prostate  
  
documented in this encounter  
[facility]  
Work Phone: [phone] note*   
  
                                                    Diagnosis  
   
                                                      
  
  
Prostate cancer (Multi)- Primary  
  
  
Malignant neoplasm of prostate  
   
                                                      
  
  
Prostate cancer (Multi)  
  
  
Malignant neoplasm of prostate  
  
documented in this encounter  
[facility]  
Work Phone: [phone] Discharge instructions* Attachments  
  
The following attachments cannot be sent through Care Everywhere.  
  
* Prostate Biopsy Discharge Instructions (English)  
  
[facility]  
Work Phone: [phone] Discharge instructions* Attachments  
  
The following attachments cannot be sent through Care Everywhere.  
  
* Fiducial Markers Discharge Instructions (English)  
  
[facility]  
Work Phone: [phone] for visit Narrative* Imaging (Routine) - 
  Authorized  
  
                          Specialty    Diagnoses / Procedures Referred By Contac  
t Referred To Contact  
   
                                        Radiology             
  
  
Diagnoses  
  
  
Elevated PSA  
  
  
  
Procedures  
  
  
MR prostate with zulma   
boundaries if pirads 3 or   
above                                     
  
  
Xin Suresh MD  
  
  
[address] [place] [address]  
  
  
Phone: tel:[phone]  
  
  
fax:[phone]                       
  
  
  
  
  
                          Referral ID  Status       Reason       Start   
Date                                    Expiration   
Date                                    Visits   
Requested                               Visits   
Authorized  
   
                                [id number]         Authorized        
  
  
Perform   
Procedure       10/9/2024       10/9/2025       1               1  
  
  
  
[facility]  
Work Phone: [phone] for visit Narrative* Auth/Cert  
  
                          Specialty    Diagnoses / Procedures Referred By Contac  
t Referred To Contact  
   
                                                              
  
  
Diagnoses  
  
  
Elevated PSA  
  
  
Elevated PSA [R97.20]  
  
  
  
Procedures  
  
  
MN PROSTATE NEEDLE BIOPSY   
ANY APPROACH  
  
  
CHG US TRANSRECTAL  
  
  
CHG US GUIDANCE NEEDLE   
PLACEMENT IMG S&I  
  
  
Biopsy Prostate with   
Navigation  
  
  
Ultrasonography Transrectal   
Prostate  
  
  
Ultrasound guidance for   
prostate fusion biopsy                    
  
  
Xin Suresh MD  
  
  
[address] [place] [address]  
  
  
Phone:   
tel:[phone]  
  
  
fax:[phone]                       
  
  
[facility] OR  
  
  
[address] [place] [address]  
  
  
fax:[phone]  
  
  
  
                Referral ID Status  Reason  Start Date Expiration Date Visits Re  
quested Visits   
Authorized  
   
                [id number]                                 1       1  
  
  
  
[facility]  
Work Phone: [phone] for visit Narrative* Auth/Cert  
  
                          Specialty    Diagnoses / Procedures Referred By Contac  
t Referred To Contact  
   
                                                              
  
  
Diagnoses  
  
  
Prostate cancer (Multi)  
  
  
Prostate cancer (Multi)   
[C61]  
  
  
  
Procedures  
  
  
MN PLMT INTERSTITIAL DEV   
RADIAT TX PROSTATE 1/MULT  
  
  
Insertion Fiducial Marker   
Prostate                                  
  
  
Xin Suresh MD  
  
  
[address] [place] [address]  
  
  
Phone:   
tel:[phone]  
  
  
fax:[phone]                       
  
  
[facility] OR  
  
  
[address] [place] [address]  
  
  
fax:[phone]  
  
  
  
                Referral ID Status  Reason  Start Date Expiration Date Visits Re  
quested Visits   
Authorized  
   
                [id number]                                 1       1  
  
  
  
[facility]  
Work Phone: [phone]  
  
Reason for Referral  
  
  
                          Specialty    Diagnoses / Procedures Referred By Contac  
t Referred To Contact  
   
                                        Radiology             
  
  
Diagnoses  
  
  
Elevated PSA  
  
  
  
Procedures  
  
  
MR prostate with zulma   
boundaries if pirads 3 or   
above                                     
  
  
Xin Suresh MD  
  
  
[address] [place] [address]  
  
  
Phone: [phone]  
  
  
Fax: [phone]                         
  
  
  
  
  
                          Referral ID  Status       Reason       Start   
Date                                    Expiration   
Date                                    Visits   
Requested                               Visits   
Authorized  
   
                                        [id number]             Pending   
Review                                    
  
  
Perform   
Procedure       10/9/2024       10/9/2025       1               1  
  
  
  
Chief Complaint  
ELEVATED PSAELEVATED PSA  
  
Family History  
No Family History Records FoundUnknown Family Member  
  
                                Name            Dates           Details  
   
                                                    No pertinent family history:  
 Mother, Father(V49.89, Z78.9)  
                                                    Status:Active  
  
                              Unknown Family Member  
  
                                Name            Dates           Details  
   
                                                    No pertinent family history:  
 Mother, Father(V49.89, Z78.9)  
                                                    Status:Active  
  
  
  
Summary Purpose  
  
  
                                                      
  
  
  
Advance Directives  
No Advanced Directives Records Found  
  
                                Date Activated  Date Inactivated Comments  
   
                                2024 6:05 AM                   
  
  
  
                                Question        Answer          Comments  
   
                                Plan of Care:   Code Status Discussion Completed  
   
   
                                Decision Maker: Patient           
  
  
  
Additional Source Comments  
  
                                     <item>  
  
                                                    Privacy Markings (unrecogniz  
ed section and content)  
   
                                                    Section Author: Mercedez Cagle   
PROHIBITION ON REDISCLOSURE OF CONFIDENTIAL   
INFORMATION   
This notice accompanies a disclosure of information concerning a client made to 
you   
with the consent of such client. This information has been disclosed to you from
   
records protected by federal confidentiality rules (42 C.F.R. Part 2). The 
federal   
rules prohibit you from making any further disclosure of this information unless
   
further disclosure is expressly permitted by the written consent of the person 
to   
whom it pertains or as otherwise permitted by 42 C.F.R. Part 2. A general   
authorization for the release of medical or other information is NOT sufficient 
for   
this purpose.  
  
  
  
  
                                                    Source Comments (unrecognize  
d section and content)  
   
                                                    In the event this informatio  
n is protected by the Federal Confidentiality of   
Alcohol   
and Drug Abuse Patient Records regulations: This information has been disclosed 
to   
you from records protected by Federal confidentiality rules (42 CFR Part 2). The
   
Federal rules prohibit you from making any further disclosure of this 
information   
unless further disclosure is expressly permitted by the written consent of the 
person   
to whom it pertains or as otherwise permitted by 42 CFR Part 2. A general   
authorization for the release of medical or other information is NOT sufficient 
for   
this purpose. The Federal rules restrict any use of the information to 
criminally   
investigate or prosecute any alcohol or drug abuse patient.[facility] 
the   
event this information is protected by the Federal Confidentiality of Alcohol 
and   
Drug Abuse Patient Records regulations: This information has been disclosed to 
you   
from records protected by Federal confidentiality rules (42 CFR Part 2). The 
Federal   
rules prohibit you from making any further disclosure of this information unless
   
further disclosure is expressly permitted by the written consent of the person 
to   
whom it pertains or as otherwise permitted by 42 CFR Part 2. A general 
authorization   
for the release of medical or other information is NOT sufficient for this 
purpose.   
The Federal rules restrict any use of the information to criminally investigate 
or   
prosecute any alcohol or drug abuse patient.[facility]  
  
  
  
  
                                                    Reason for Visit (unrecogniz  
ed section and content)  
   
                                          
  
  
  
                                        Reason              Comments  
   
                                        Diabetic Eye Exam     
  
  
  
                                        Reason              Comments  
   
                                        6 month with psa      
  
  
  
                                        Reason              Comments  
   
                                        Follow-up             
  
  
  
                                        Reason              Comments  
   
                                        Prostate MRI results   
  
  
  
                                        Reason              Comments  
   
                                        Prostate Cancer       
  
  
  
  
  
                                                    Care Teams (unrecognized sec  
tion and content)  
   
                                          
  
  
  
                      Team Member Relationship Specialty  Start Date End Date  
   
                                                      
  
  
Cecilia Walker MD  
  
  
[id number] [facility]  
  
  
[address]  
  
  
[place] [address]  
  
  
[phone] (Work)  
  
  
[phone] (Fax) PCP - General   Internal Medicine 16           
  
  
  
                      Team Member Relationship Specialty  Start Date End Date  
   
                                                      
  
  
Cecilia Walker MD  
  
  
NPI: [id number]  
  
  
[address] [facility]  
  
  
[address]  
  
  
[place] [address]  
  
  
[phone] (Work)  
  
  
[phone] (Fax) PCP - General                   21           
  
  
  
                      Team Member Relationship Specialty  Start Date End Date  
   
                                                      
  
  
Cecilia Walker MD  
  
  
NPI: [id number]  
  
  
[address] [facility]  
  
  
[address]  
  
  
[place] [address]  
  
  
[phone] (Work)  
  
  
[phone] (Fax) PCP - General   Internal Medicine 16           
  
  
  
                      Team Member Relationship Specialty  Start Date End Date  
   
                                                      
  
  
Cecilia Walker MD  
  
  
NPI: [id number]  
  
  
[phone] (Work)  
  
  
[phone] (Fax) PCP - General                   21           
  
  
  
                      Team Member Relationship Specialty  Start Date End Date  
   
                                                      
  
  
Cecilia Walker MD  
  
  
NPI: [id number]  
  
  
[address] [facility]  
  
  
[facility]  
  
  
[address]  
  
  
[place] [address]  
  
  
[phone] (Work)  
  
  
[phone] (Fax) PCP - General   Internal Medicine 10/23/24          
  
  
  
                      Team Member Relationship Specialty  Start Date End Date  
   
                                                      
  
  
Cecilia Walker MD  
  
  
NPI: [id number]  
  
  
[address] [facility]  
  
  
[facility]  
  
  
[address]  
  
  
[place] [address]  
  
  
[phone] (Work)  
  
  
[phone] (Fax) PCP - General   Internal Medicine 10/23/24          
  
  
  
                      Team Member Relationship Specialty  Start Date End Date  
   
                                                      
  
  
Cecilia Walker MD  
  
  
NPI: [id number]  
  
  
[address] [facility]  
  
  
[facility]  
  
  
[address]  
  
  
[place] [address]  
  
  
[phone] (Work)  
  
  
[phone] (Fax) PCP - General   Internal Medicine 10/23/24          
  
  
  
                      Team Member Relationship Specialty  Start Date End Date  
   
                                                      
  
  
Cecilia Walker MD  
  
  
NPI: [id number]  
  
  
[address] [facility]  
  
  
[facility]  
  
  
[address]  
  
  
[place] [address]  
  
  
[phone] (Work)  
  
  
[phone] (Fax) PCP - General   Internal Medicine 10/23/24          
  
  
  
                      Team Member Relationship Specialty  Start Date End Date  
   
                                                      
  
  
Cecilia Walker MD  
  
  
NPI: [id number]  
  
  
[address] [facility]  
  
  
[facility]  
  
  
[address]  
  
  
[place] [address]  
  
  
[phone] (Work)  
  
  
[phone] (Fax) PCP - General   Internal Medicine 10/23/24          
  
  
  
  
  
                                                    PREGNANCY (unrecognized sect  
ion and content)  
   
                                                    No Pregnancy Status Records   
FoundNo Pregnancy Status Records FoundNo Pregnancy   
Status   
Records FoundNo Pregnancy Status Records FoundNo Pregnancy Status Records 
FoundNo   
Pregnancy Status Records FoundNo Pregnancy Status Records Found  
  
  
  
  
                                                    INFORMATION SOURCE (unrecogn  
ized section and content)  
   
                                          
  
  
  
                                        DATE CREATED        AUTHOR  
   
                                2023                      [facility]  
  
  
  
                                DATE CREATED    AUTHOR          AUTHOR'S ORGANIZ  
ATION  
   
                                2023                       [facility]  
  
  
  
                                DATE CREATED    AUTHOR          AUTHOR'S ORGANIZ  
ATION  
   
                                2023                      [facility]  
  
  
  
                                DATE CREATED    AUTHOR          AUTHOR'S ORGANIZ  
ATION  
   
                                2024                      [facility]  
  
  
  
                                DATE CREATED    AUTHOR          AUTHOR'S ORGANIZ  
ATION  
   
                                2024                      [facility]  
  
  
  
                                DATE CREATED    AUTHOR          AUTHOR'S ORGANIZ  
ATION  
   
                                12/15/2024                      [facility]  
  
  
  
                                DATE CREATED    AUTHOR          AUTHOR'S ORGANIZ  
ATION  
   
                                2025                      [facility]  
  
  
  
     Inactive Administered Medications - up to 3 most recent administrations  
  
                                                    Administered Medications (un  
recognized section and content)  
   
                                          
  
  
  
                    Medication Order MAR Action Action Date Dose      Rate        
Site  
   
                                                      
  
  
PHENYLephrine 2.5 % 1 Drop   
(AK-DILATE, ROCKY-SYNEPHRINE)  
  
  
1 Drop, BOTH EYES, ONCE, 1 dose,   
On Wed 3/6/24 at 0830, FOR   
OPHTHALMIC USE ONLY PROTECT FROM   
LIGHT        Given        2024 8:30 AM EST 1 Drop                      
   
                                                               
   
                                                      
  
  
tropicamide 1 % 1 Drop (MYDRIACYL)  
  
  
1 Drop, BOTH EYES, ONCE, 1 dose,   
On Wed 3/6/24 at 0830, FOR THE EYE Given        2024 8:30 AM EST 1 Drop     
                   
  
  
  
                                    Scheduled  
  
                                                    Active and Recently Administ  
ered Medications (unrecognized section and content)  
   
                                          
  
  
  
                          Medication Order 2024  
   
                                                      
  
  
acetaminophen (Tylenol) tablet   
975 mg (COMPLETED)  
975 mg, oral, Once, On 24 at 0630, For 1 dose,   
Preprocedure, Administer with   
small amount of water   
preoperatively., If ordered PRN   
for pain, nurse is permitted to   
administer this medication for   
higher pain scores based on   
patient preference? Yes  
                                                            0648 (Given - Provid  
er: Kathy Valenzuela RN - Comment: preop)  
  
   
                                                      
  
  
cefTRIAXone (Rocephin) 2 g in   
dextrose (iso) IV 50 mL   
(COMPLETED)  
2 g, intravenous, at 100 mL/hr,   
Administer over 30 Minutes,   
Once, On 24 at 0845,   
For 1 dose, premix bag,   
Suspected Indication (Select all   
that apply): Surgical   
Prophylaxis, Indications:   
Surgical Prophylaxis  
                                                            0824 (New Bag - Prov  
ider:   
Amber Patricia RN)0854 (Due:   
Stopped - Provider: Amber Patricia RN)  
  
  
                                   Continuous  
  
                          Medication Order 2024  
   
                                                      
  
  
lactated Ringer's infusion  
100 mL/hr, intravenous, Continuous, Starting on 24 at 0830, For 1 day, Recovery (only)  
                                                            0830 (Due)  
  
  
                                       PRN  
  
                          Medication Order 2024  
   
                                                      
  
  
labetaloL (Normodyne,Trandate)   
injection 5 mg  
5 mg, intravenous, Administer   
over 1 Minutes, Once as needed,   
systolic blood pressure greater   
than 180 mmHg, dystolic blood   
pressure greater than 100 mmHg   
and heart rate greater than 60   
BPM, Starting on 24 at   
0809, For 1 dose, Recovery (only)  
                                                              
   
                                                      
  
  
morphine injection 2 mg  
2 mg, intravenous, Every 5 min   
PRN, pain moderate (4-6), first   
line, Starting on 24 at   
0809, Recovery (only), Max total   
of 20 mg regardless of dose.  
                                                            0818 (Given - Provid  
er:   
Amber Patricia RN)  
  
   
                                                      
  
  
morphine injection 4 mg  
4 mg, intravenous, Every 5 min   
PRN, pain severe (7-10), first   
line, Starting on 24 at   
0809, Recovery (only), Max total   
of 20 mg regardless of dose.  
                                                              
   
                                                      
  
  
ondansetron (Zofran) injection 4   
mg (COMPLETED)  
4 mg, intravenous, Once as   
needed, nausea/vomiting, first   
line, Starting on 24 at   
0809, For 1 dose, Recovery   
(only), When administering via IV   
Push, administer over 3-5   
minutes.  
                                                            08 (Given - Provid  
er:   
Amber Patricia RN)  
  
   
                                                      
  
  
oxyCODONE (Roxicodone) immediate   
release tablet 5 mg  
5 mg, oral, Every 4 hours PRN,   
pain mild (1-3), first line,   
Starting on 24 at 0809,   
Recovery (only), When able to   
take oral medications., If   
ordered PRN for pain, nurse is   
permitted to administer this   
medication for higher pain scores   
based on patient preference? Yes  
                                                              
   
                                                      
  
  
promethazine (Phenergan) 6.25 mg   
in sodium chloride 0.9% 50 mL IV  
6.25 mg, intravenous, Administer   
over 15 Minutes, Once as needed,   
Nausea/vomiting, second line,   
Starting on 24 at 0809,   
For 1 dose, Recovery (only)  
                                                              
  
                                    Scheduled  
  
                          Medication Order 2025  
   
                                                      
  
  
acetaminophen (Tylenol) tablet 975   
mg (COMPLETED)  
975 mg, oral, Once, On 25   
at 1145, For 1 dose, Preprocedure,   
Administer with small amount of   
water preoperatively., If ordered   
PRN for pain, nurse is permitted   
to administer this medication for   
higher pain scores based on   
patient preference? Yes  
                                                            1205 (Given - Provid  
er:   
Najma Washburn RN)  
  
   
                                                      
  
  
dexAMETHasone (PF) (Decadron)   
injection 8 mg (COMPLETED)  
8 mg, intravenous, Once, On 25 at 1145, For 1 dose,   
Preprocedure  
                                                            1209 (Given - Provid  
er:   
Najma Washburn RN)  
  
   
                                                      
  
  
levoFLOXacin (Levaquin) 500 mg in   
dextrose 5%  mL (COMPLETED)  
500 mg, intravenous, at 100 mL/hr,   
Administer over 60 Minutes, Once,   
On 25 at 1115, For 1   
dose, Intraprocedure, premix bag,   
Dosing of this medication varies   
based on severity of illness. Does   
this patient have sepsis or   
concern for sepsis (probable or   
documented infection plus systemic   
manifestations of infection)? No,   
Suspected Indication (Select all   
that apply): Surgical Prophylaxis,   
Indications: Surgical Prophylaxis  
                                                            1227 (New Bag - Prov  
ider:   
Najma Washburn RN -   
Comment: waiting until closer   
to OR)1327 (Due: Stopped -   
Provider: Najma Washburn RN)  
  
   
                                                      
  
  
midazolam (Versed) injection 2 mg   
(COMPLETED)  
2 mg, intravenous, Once, On 25 at 1245, For 1 dose  
                                                            1219 (Given - Provid  
er:   
Najma Washburn RN)  
  
   
                                                      
  
  
ondansetron (Zofran) injection 4   
mg (COMPLETED)  
4 mg, intravenous, Once, On 25 at 1145, For 1 dose,   
Preprocedure, When administering   
via IV Push, administer over 3-5   
minutes.  
                                                            1209 (Given - Provid  
er:   
Najma Washburn RN)  
  
  
                                   Continuous  
  
                          Medication Order 2025  
   
                                                      
  
  
lactated Ringer's infusion  
100 mL/hr, intravenous, Continuous, Starting on 25 at 1415, For 1 day, Recovery (only)  
                                                            1415 (Due)  
  
  
                                       PRN  
  
                          Medication Order 2025  
   
                                                      
  
  
labetaloL (Normodyne,Trandate)   
injection 5 mg  
5 mg, intravenous, Administer over   
1 Minutes, Once as needed,   
systolic blood pressure greater   
than 180 mmHg, dystolic blood   
pressure greater than 100 mmHg and   
heart rate greater than 60 BPM,   
Starting on 25 at 1346,   
For 1 dose, Recovery (only)  
                                                              
   
                                                      
  
  
morphine injection 2 mg  
2 mg, intravenous, Every 5 min   
PRN, pain moderate (4-6), first   
line, Starting on 25 at   
1346, Recovery (only), Max total   
of 20 mg regardless of dose.  
                                                              
   
                                                      
  
  
morphine injection 4 mg  
4 mg, intravenous, Every 5 min   
PRN, pain severe (7-10), first   
line, Starting on 25 at   
1346, Recovery (only), Max total   
of 20 mg regardless of dose.  
                                                            1350 (Given - Provid  
er: Annette Sawant RN)1359 (Given -   
Provider: Annette Sawant RN)  
  
   
                                                      
  
  
ondansetron (Zofran) injection 4   
mg  
4 mg, intravenous, Once as needed,   
nausea/vomiting, first line,   
Starting on 25 at 1346,   
For 1 dose, Recovery (only), When   
administering via IV Push,   
administer over 3-5 minutes.  
                                                              
   
                                                      
  
  
oxyCODONE (Roxicodone) immediate   
release tablet 5 mg  
5 mg, oral, Every 4 hours PRN,   
pain mild (1-3), first line,   
Starting on 25 at 1346,   
Recovery (only), When able to take   
oral medications., If ordered PRN   
for pain, nurse is permitted to   
administer this medication for   
higher pain scores based on   
patient preference? Yes  
                                                              
   
                                                      
  
  
promethazine (Phenergan) 6.25 mg   
in sodium chloride 0.9% 50 mL IV  
6.25 mg, intravenous, Administer   
over 15 Minutes, Once as needed,   
Nausea/vomiting, second line,   
Starting on 25 at 1346,   
For 1 dose, Recovery (only)  
                                                              
   
                                                      
  
  
sodium chloride bacteriostatic 0.9   
% injection (CANCELED)  
As needed, Starting on 25   
at 1323, Intraprocedure  
                                                            1323 (Given - Provid  
er: Xin Suresh MD - Comment:   
PROSTATE)  
  
  
  
FOR RECORDS PERTAINING TO PATIENTS WHO ARE OR HAVE BEEN ENROLLED IN A CHEMICAL 
DEPENDENCY/SUBSTANCEABUSE PROGRAM, SOME INFORMATION MAY BE OMITTED. This 
clinical summary was aggregated from multiple sources. Caution should be 
exercised in using it in the provision of clinical care. This summary normalizes
 information from multiple sources, and as a consequence, information in this 
document may materially change the coding, format and clinical context of 
patient data. In addition, data may be omitted in some cases. CLINICAL DECISIONS
 SHOULD BE BASED ON THE PRIMARY CLINICAL RECORDS. [email or web address] provides
 no warranty or guarantee of the accuracy or completeness of information in this
 document.

## 2025-01-31 NOTE — MRI_ITS
PROCEDURE:  
PELVIS W/WO CONTRAST  
   
REASON FOR EXAM:  
   
Preoperative scan for radiation planning/prostate cancer.  
   
TECHNIQUE:  
Multiplanar, multisequence MRI of the prostate was performed before and 
following intravenous gadolinium-based contrast. Axial,  
coronal, and sagittal high-resolution T2-weighted images, axial T1-weighted 
images, diffusion-weighted images with high B value,  
and dynamic postcontrast fat saturated T1-weighted images were performed.  
CONTRAST: 17 cc Clariscan intravenous  
   
COMPARISON:  
None.  
   
FINDINGS:  
Prostate Volume: 43.9 mL (prostate measures 4.9 x 4.5 x 3.8 cm)  
   
Peripheral Zone: No abnormality on ADC and high b-value DWI.  
   
Transitional Zone: Homogeneous intermediate signal intensity (normal).  
   
Seminal vesicles: Normal and symmetric.  
   
Neurovascular bundles: Normal and symmetric.  
   
Lymph nodes: No lymphadenopathy is identified.  
   
Bone marrow: No suspicious lesions.  
   
Miscellaneous: Spacer gel between the prostate and rectum measures 4.2 x 3.6 x 
1.6 cm.  Small fat containing inguinal hernias  
bilaterally.  
   
ORDER #: [id number] MRI/Pelvis W/WO Contrast  
IMPRESSION:  
Prostatomegaly with no suspicious PI-RADS 3 or greater lesions identified.  No 
metastatic disease is present.  Spacer gel between  
the prostate and rectum is in place.  
   
Electronically Signed By: Shantanu Hoffmann DO on 02/04/2025 22:11  
Reading Location: [facility]

## 2025-04-16 ENCOUNTER — APPOINTMENT (OUTPATIENT)
Dept: UROLOGY | Facility: CLINIC | Age: 65
End: 2025-04-16
Payer: COMMERCIAL

## (undated) DEVICE — DRESSING, NON-ADHERENT, TELFA, OUCHLESS, 3 X 8 IN, STERILE

## (undated) DEVICE — DRESSING, GAUZE, SPONGE, 12 PLY, CURITY, 4 X 4 IN, RIGID TRAY, STERILE

## (undated) DEVICE — GLOVE, PROTEXIS PI CLASSIC, SZ-8.0, PF, PF, LF

## (undated) DEVICE — STRAP, KNEE AND BODY, SINGLE USE

## (undated) DEVICE — MARKER, SKIN, REGULAR TIP, W/W/FLEXI RULER, LABEL

## (undated) DEVICE — TOWEL, OR, XRAY DECTECTABLE, 17 X 27, BLUE, 4/PK, STERILE

## (undated) DEVICE — MASK, FACE, ANESTHESIA, ADULT LARGE, INFL PORT, LF

## (undated) DEVICE — SOLUTION, SALINE, 100ML

## (undated) DEVICE — CIRCUIT, BREATHING, ADULT, B/V FILTER, HMEF, 3 L BAG, 108 IN

## (undated) DEVICE — SYSTEM, SPACEOAR VUE, HYDROGEL

## (undated) DEVICE — STRAP, ARMBOARD, 3IN, BLUE/WHITE, SECURE HOOK

## (undated) DEVICE — SOLUTION, PREP, PVP IODINE, FLIP TOP, 4OZ

## (undated) DEVICE — NEEDLE, BIOPSY, MAX CORE, 18G

## (undated) DEVICE — COVER, MAYO STAND, 23-1/2 X 56, LF

## (undated) DEVICE — SCISSOR, BANDAGE, LISTER, ANGLED BLUNT, 4.5 IN, ECONOMY

## (undated) DEVICE — Device